# Patient Record
Sex: FEMALE | Race: WHITE | NOT HISPANIC OR LATINO
[De-identification: names, ages, dates, MRNs, and addresses within clinical notes are randomized per-mention and may not be internally consistent; named-entity substitution may affect disease eponyms.]

---

## 2018-02-08 ENCOUNTER — TRANSCRIPTION ENCOUNTER (OUTPATIENT)
Age: 28
End: 2018-02-08

## 2018-03-05 ENCOUNTER — TRANSCRIPTION ENCOUNTER (OUTPATIENT)
Age: 28
End: 2018-03-05

## 2018-08-13 ENCOUNTER — TRANSCRIPTION ENCOUNTER (OUTPATIENT)
Age: 28
End: 2018-08-13

## 2019-10-10 ENCOUNTER — TRANSCRIPTION ENCOUNTER (OUTPATIENT)
Age: 29
End: 2019-10-10

## 2019-12-17 ENCOUNTER — TRANSCRIPTION ENCOUNTER (OUTPATIENT)
Age: 29
End: 2019-12-17

## 2021-05-26 ENCOUNTER — TRANSCRIPTION ENCOUNTER (OUTPATIENT)
Age: 31
End: 2021-05-26

## 2021-06-02 ENCOUNTER — EMERGENCY (EMERGENCY)
Facility: HOSPITAL | Age: 31
LOS: 0 days | Discharge: HOME | End: 2021-06-02
Attending: STUDENT IN AN ORGANIZED HEALTH CARE EDUCATION/TRAINING PROGRAM | Admitting: STUDENT IN AN ORGANIZED HEALTH CARE EDUCATION/TRAINING PROGRAM
Payer: MEDICAID

## 2021-06-02 VITALS
SYSTOLIC BLOOD PRESSURE: 117 MMHG | TEMPERATURE: 102 F | DIASTOLIC BLOOD PRESSURE: 65 MMHG | HEART RATE: 125 BPM | RESPIRATION RATE: 22 BRPM | OXYGEN SATURATION: 100 %

## 2021-06-02 VITALS — HEART RATE: 85 BPM

## 2021-06-02 DIAGNOSIS — U07.1 COVID-19: ICD-10-CM

## 2021-06-02 DIAGNOSIS — R50.9 FEVER, UNSPECIFIED: ICD-10-CM

## 2021-06-02 DIAGNOSIS — R05 COUGH: ICD-10-CM

## 2021-06-02 DIAGNOSIS — R53.1 WEAKNESS: ICD-10-CM

## 2021-06-02 LAB
ALBUMIN SERPL ELPH-MCNC: 3.8 G/DL — SIGNIFICANT CHANGE UP (ref 3.5–5.2)
ALP SERPL-CCNC: 43 U/L — SIGNIFICANT CHANGE UP (ref 30–115)
ALT FLD-CCNC: 13 U/L — SIGNIFICANT CHANGE UP (ref 0–41)
ANION GAP SERPL CALC-SCNC: 9 MMOL/L — SIGNIFICANT CHANGE UP (ref 7–14)
AST SERPL-CCNC: 26 U/L — SIGNIFICANT CHANGE UP (ref 0–41)
BASOPHILS # BLD AUTO: 0.01 K/UL — SIGNIFICANT CHANGE UP (ref 0–0.2)
BASOPHILS NFR BLD AUTO: 0.4 % — SIGNIFICANT CHANGE UP (ref 0–1)
BILIRUB SERPL-MCNC: 0.4 MG/DL — SIGNIFICANT CHANGE UP (ref 0.2–1.2)
BUN SERPL-MCNC: 5 MG/DL — LOW (ref 10–20)
CALCIUM SERPL-MCNC: 8.9 MG/DL — SIGNIFICANT CHANGE UP (ref 8.5–10.1)
CHLORIDE SERPL-SCNC: 96 MMOL/L — LOW (ref 98–110)
CO2 SERPL-SCNC: 28 MMOL/L — SIGNIFICANT CHANGE UP (ref 17–32)
CREAT SERPL-MCNC: 0.6 MG/DL — LOW (ref 0.7–1.5)
EOSINOPHIL # BLD AUTO: 0 K/UL — SIGNIFICANT CHANGE UP (ref 0–0.7)
EOSINOPHIL NFR BLD AUTO: 0 % — SIGNIFICANT CHANGE UP (ref 0–8)
GLUCOSE SERPL-MCNC: 107 MG/DL — HIGH (ref 70–99)
HCG SERPL QL: NEGATIVE — SIGNIFICANT CHANGE UP
HCT VFR BLD CALC: 40.8 % — SIGNIFICANT CHANGE UP (ref 37–47)
HGB BLD-MCNC: 13.8 G/DL — SIGNIFICANT CHANGE UP (ref 12–16)
IMM GRANULOCYTES NFR BLD AUTO: 0.4 % — HIGH (ref 0.1–0.3)
LYMPHOCYTES # BLD AUTO: 0.69 K/UL — LOW (ref 1.2–3.4)
LYMPHOCYTES # BLD AUTO: 27.2 % — SIGNIFICANT CHANGE UP (ref 20.5–51.1)
MCHC RBC-ENTMCNC: 30 PG — SIGNIFICANT CHANGE UP (ref 27–31)
MCHC RBC-ENTMCNC: 33.8 G/DL — SIGNIFICANT CHANGE UP (ref 32–37)
MCV RBC AUTO: 88.7 FL — SIGNIFICANT CHANGE UP (ref 81–99)
MONOCYTES # BLD AUTO: 0.22 K/UL — SIGNIFICANT CHANGE UP (ref 0.1–0.6)
MONOCYTES NFR BLD AUTO: 8.7 % — SIGNIFICANT CHANGE UP (ref 1.7–9.3)
NEUTROPHILS # BLD AUTO: 1.61 K/UL — SIGNIFICANT CHANGE UP (ref 1.4–6.5)
NEUTROPHILS NFR BLD AUTO: 63.3 % — SIGNIFICANT CHANGE UP (ref 42.2–75.2)
NRBC # BLD: 0 /100 WBCS — SIGNIFICANT CHANGE UP (ref 0–0)
PLATELET # BLD AUTO: 102 K/UL — LOW (ref 130–400)
POTASSIUM SERPL-MCNC: 3.6 MMOL/L — SIGNIFICANT CHANGE UP (ref 3.5–5)
POTASSIUM SERPL-SCNC: 3.6 MMOL/L — SIGNIFICANT CHANGE UP (ref 3.5–5)
PROT SERPL-MCNC: 6.5 G/DL — SIGNIFICANT CHANGE UP (ref 6–8)
RBC # BLD: 4.6 M/UL — SIGNIFICANT CHANGE UP (ref 4.2–5.4)
RBC # FLD: 12.9 % — SIGNIFICANT CHANGE UP (ref 11.5–14.5)
SODIUM SERPL-SCNC: 133 MMOL/L — LOW (ref 135–146)
WBC # BLD: 2.54 K/UL — LOW (ref 4.8–10.8)
WBC # FLD AUTO: 2.54 K/UL — LOW (ref 4.8–10.8)

## 2021-06-02 PROCEDURE — 93010 ELECTROCARDIOGRAM REPORT: CPT

## 2021-06-02 PROCEDURE — 71045 X-RAY EXAM CHEST 1 VIEW: CPT | Mod: 26

## 2021-06-02 PROCEDURE — 99285 EMERGENCY DEPT VISIT HI MDM: CPT

## 2021-06-02 RX ORDER — KETOROLAC TROMETHAMINE 30 MG/ML
15 SYRINGE (ML) INJECTION ONCE
Refills: 0 | Status: DISCONTINUED | OUTPATIENT
Start: 2021-06-02 | End: 2021-06-02

## 2021-06-02 RX ORDER — SODIUM CHLORIDE 9 MG/ML
1000 INJECTION INTRAMUSCULAR; INTRAVENOUS; SUBCUTANEOUS ONCE
Refills: 0 | Status: COMPLETED | OUTPATIENT
Start: 2021-06-02 | End: 2021-06-02

## 2021-06-02 RX ORDER — ACETAMINOPHEN 500 MG
650 TABLET ORAL ONCE
Refills: 0 | Status: COMPLETED | OUTPATIENT
Start: 2021-06-02 | End: 2021-06-02

## 2021-06-02 RX ADMIN — Medication 15 MILLIGRAM(S): at 11:22

## 2021-06-02 RX ADMIN — Medication 650 MILLIGRAM(S): at 13:06

## 2021-06-02 RX ADMIN — SODIUM CHLORIDE 1000 MILLILITER(S): 9 INJECTION INTRAMUSCULAR; INTRAVENOUS; SUBCUTANEOUS at 11:21

## 2021-06-02 NOTE — ED PROVIDER NOTE - PHYSICAL EXAMINATION
CONST: NAD  EYES: Sclera and conjunctiva clear.   ENT: No nasal discharge. Oropharynx normal appearing, no erythema or exudates. No abscess or swelling. Uvula midline.   NECK: Non-tender, no meningeal signs. normal ROM. supple   CARD: Tachycardiac S1 S2; No jvd  RESP: LLL rhonchi. No distress  GI: Soft, non-tender, non-distended. no cva tenderness. normal BS  MS: Normal ROM in all extremities. pulses 2 +. no calf tenderness or swelling  SKIN: Warm, dry, no acute rashes. Good turgor  NEURO: A&Ox3, No focal deficits. Strength 5/5 with no sensory deficits.

## 2021-06-02 NOTE — ED PROVIDER NOTE - NS ED ROS FT
Constitutional: (+) fever  Eyes/ENT: (-) blurry vision, (-) epistaxis  Cardiovascular: (-) chest pain, (-) syncope  Respiratory: (+) cough, (+) shortness of breath  Gastrointestinal: (-) vomiting, (-) diarrhea  : (-) dysuria, (-) hematuria  Musculoskeletal: (-) neck pain, (-) back pain, (-) joint pain  Integumentary: (-) rash, (-) edema  Neurological: (-) headache, (-) altered mental status  Allergic/Immunologic: (-) pruritus

## 2021-06-02 NOTE — ED PROVIDER NOTE - CLINICAL SUMMARY MEDICAL DECISION MAKING FREE TEXT BOX
labs c/w covid, no leukocytosis. xr findings more likely related to viral pna from covid as opposed to bacterial pna. will dc w/ covid clinic f/u and return precautions

## 2021-06-02 NOTE — ED PROVIDER NOTE - ATTENDING CONTRIBUTION TO CARE
32 yo f  pt presents for eval of covid sx. pt tested positive . pt endorsing cough, fever, sob, body aches,  appetite. no vomiting or diarrhea. no pleuritic cp.  no syncope. no leg swelling. pt spoke to her pcp who advised her to come to ER for eval.      vs tachycardic, febrile  ambulatory o2 97% on RA  gen- NAD, aaox3  card-rrr  lungs-no increased wob, mild crackles to L side  abd-sntnd, no guarding or rebound  neuro- full str/sensation, cn ii-xii grossly intact, normal coordination and gait    likely covid, will get screening labs for decreased PO intake  will treat supportively, cxr given crackles  reassess

## 2021-06-02 NOTE — ED PROVIDER NOTE - OBJECTIVE STATEMENT
31y F pmh COVID (+) presents for weakness. Pt has intermittent fever, cough, sob, body aches and weakness since 5/28 when she tested (+) COVID, relief with tylenol, no aggravating factors. Denies ha, cp, dysuria, hematuria, abd pain, n/v/d/c

## 2021-06-02 NOTE — ED ADULT NURSE NOTE - OBJECTIVE STATEMENT
fever on set saturday + sick contacts at home. reports chest pain and vomiting with coughing . abdomen soft non tender non distended. Sinus tach on monitor chest pain non reproducible to palpation lung sounds clear .

## 2021-06-02 NOTE — ED ADULT NURSE NOTE - NSIMPLEMENTINTERV_GEN_ALL_ED
Implemented All Universal Safety Interventions:  [place] to call system. Call bell, personal items and telephone within reach. Instruct patient to call for assistance. Room bathroom lighting operational. Non-slip footwear when patient is off stretcher. Physically safe environment: no spills, clutter or unnecessary equipment. Stretcher in lowest position, wheels locked, appropriate side rails in place.

## 2021-06-02 NOTE — ED PROVIDER NOTE - NSFOLLOWUPINSTRUCTIONS_ED_ALL_ED_FT
Follow up with PMD and COVID recovery clinic in 1-2 days.    Novel Coronavirus (COVID-19)  The Facts  What is a coronavirus?  Coronaviruses are a large family of viruses that cause illnesses ranging from the common cold  to more severe diseases such as Middle East Respiratory Syndrome (MERS) and Severe Acute  Respiratory Syndrome (SARS).  What is Novel Coronavirus (COVID-19)?  COVID-19 is a new strain of Coronavirus that has not been previously identified in humans. COVID-19  was identified in [place], [place], [place] in December 2019 (COVID-19). COVID-19 has  since been identified outside of [place], in a growing number of countries internationally, including  the [place].  Where can I find the most recent information about COVID-19?  The Centers for Disease Control and Prevention (CDC) is closely monitoring the outbreak caused by the  COVID-19. For the latest information about COVID- 19, visit the CDC website at  https://www.cdc.gov/coronavirus/index.html  How are coronaviruses spread?  Coronaviruses can be transmitted from person-to- person, usually after close contact with an infected person,  for example, in a household, workplace, or healthcare setting via droplets that become airborne after a cough  or sneeze by an affected person. These droplets can then infect a nearby person. It is likely transmission also  occurs by touching recently contaminated surfaces.  What are the symptoms of coronavirus infection?  It depends on the virus, but common signs include fever and/or respiratory symptoms such as  cough and shortness of breath. In more severe cases, infection can cause pneumonia, severe acute  respiratory syndrome, kidney failure and even death. Fortunately, most cases of COVID-19 have an  illness no different than the influenza “flu”. With a majority of these patients having mild symptoms  and overall mortality which appears to be not much different than the flu.  Is there a treatment for a COVID-19?  There is no specific treatment for disease caused by COVID-19. However, many of the symptoms can  be treated based on the patient’s clinical condition. Supportive care for infected persons can be highly  effective.  What can I do to protect myself?  Washing your hands, covering your cough, and disinfecting surfaces are the best precautionary  measures. It is also advisable to avoid close contact with anyone showing symptoms of respiratory  illness such as coughing and sneezing. Those with symptoms should wear a surgical mask when  around others.  What can I do to protect those around me?  If you have been identified as someone who may be infected with COVID-19, we recommend you  follow the self-isolation procedures outlined below to protect those around you and limit the spread  of this virus.   March 3, 2020  Recommendations for Patients Advised to Self-Isolate  for Possible COVID-19 Exposure  We recommend the below precautionary steps from now until 14 days from when you  returned from your travel or date of your last known possible contact:  - Do not go to work, school, or public areas. Avoid using public transportation, ride-sharing, or  taxis.  - As much as possible, separate yourself from other people in your home. If you can, you should  stay in a room and away from other people in your home. Also, you should use a separate  bathroom, if available.  - Wear the supplied mask whenever you are around other people.  - If you have a non-urgent medical appointment, please reschedule for a later date. If the  appointment is urgent, please call the healthcare provider and tell them that you are on selfisolation for possible COVID-19. This will help the healthcare provider’s office take steps to keep  other people from getting infected or exposed. If you can reschedule routine appointments, do  so.  - Wash your hands often with soap and water for at least 15 to 20 seconds or clean your hands  with an alcohol-based hand  that contains 60 to 95% alcohol, covering all surfaces of  your hands and rubbing them together until they feel dry. Soap and water should be used  preferentially if hands are visibly dirty.  - Cover your mouth and nose with a tissue when you cough or sneeze. Throw used tissues in a  lined trash can; immediately wash your hands.  - Avoid touching your eyes, nose, and mouth with your hands.  - Avoid sharing personal household items. You should not share dishes, drinking glasses, cups,  eating utensils, towels, or bedding with other people or pets in your home. After using these  items, they should be washed thoroughly with soap and water.  - Clean and disinfect all “high-touch” surfaces every day. High touch surfaces include counters,  tabletops, doorknobs, light switches, remote controls, bathroom fixtures, toilets, phones,  keyboards, tablets, and bedside tables. Also, clean any surfaces that may have blood, stool, or  body fluids on them.       If you develop worsening symptoms:  - If you develop worsening symptoms, such as severe shortness of breath, please call [phone] option #9. They will assist you in determining your next steps.  During your time on self-isolation do the following:  - Work from home if you are able to so.  - Limit social isolation by talking with friends and family on the phone or with face-time  - Talk with friends and relatives who don’t live with you about supporting each other if one  household has to be quarantined. For example, agree to drop groceries or other supplies at the  front door.  - Exercise and spend time outdoors away from others if able to do so.    Why didn’t I get tested for novel coronavirus (COVID-19)?  The number of available tests is very limited so strict rules exist for who is allowed to be tested.  [facility] has been authorized to perform testing and is currently working hard to be  able to start providing the test. Such testing is currently reserved for patients who have had  contact with someone infected with the virus, or those who are very sick a plus those who have  traveled to areas identified by the Centers for Disease Control and Prevention (CD) and will  require hospitalization.  What should I do now?  If you are well enough to be discharged home and are not in a high risk group to have  contracted the COVID-10, you should care for yourself at home exactly like you would if you  have Influenza “flu”. Follow all the standard guidelines about washing your hands, covering  your cough, etc.  You should return to the Emergency Department if you develop worse symptoms, trouble  breathing, chest pain, and/or a fever that doesn’t improve with over the counter  acetaminophen or ibuprofen.

## 2021-06-02 NOTE — ED PROVIDER NOTE - PATIENT PORTAL LINK FT
You can access the FollowMyHealth Patient Portal offered by [facility] by registering at the following website: [email or web address]. By joining [email or web address]’s FollowMyHealth portal, you will also be able to view your health information using other applications (apps) compatible with our system.

## 2021-06-02 NOTE — ED PROVIDER NOTE - NSFOLLOWUPCLINICS_GEN_ALL_ED_FT
[facility] [place] [address]  Phone: [phone]  Fax:

## 2021-06-02 NOTE — ED ADULT TRIAGE NOTE - CHIEF COMPLAINT QUOTE
"I tested (+) for Covid last Friday. I came because I feel very weak. I have been sick for over a week now."

## 2021-06-05 ENCOUNTER — TRANSCRIPTION ENCOUNTER (OUTPATIENT)
Age: 31
End: 2021-06-05

## 2022-04-03 ENCOUNTER — EMERGENCY (EMERGENCY)
Facility: HOSPITAL | Age: 32
LOS: 0 days | Discharge: HOME | End: 2022-04-03
Attending: EMERGENCY MEDICINE | Admitting: EMERGENCY MEDICINE
Payer: MEDICAID

## 2022-04-03 VITALS
DIASTOLIC BLOOD PRESSURE: 83 MMHG | HEART RATE: 124 BPM | SYSTOLIC BLOOD PRESSURE: 106 MMHG | RESPIRATION RATE: 18 BRPM | OXYGEN SATURATION: 99 % | TEMPERATURE: 98 F | WEIGHT: 136.91 LBS

## 2022-04-03 VITALS
OXYGEN SATURATION: 98 % | SYSTOLIC BLOOD PRESSURE: 101 MMHG | TEMPERATURE: 99 F | RESPIRATION RATE: 18 BRPM | HEART RATE: 96 BPM | DIASTOLIC BLOOD PRESSURE: 57 MMHG

## 2022-04-03 DIAGNOSIS — R11.2 NAUSEA WITH VOMITING, UNSPECIFIED: ICD-10-CM

## 2022-04-03 DIAGNOSIS — M54.50 LOW BACK PAIN, UNSPECIFIED: ICD-10-CM

## 2022-04-03 DIAGNOSIS — M54.16 RADICULOPATHY, LUMBAR REGION: ICD-10-CM

## 2022-04-03 DIAGNOSIS — O26.891 OTHER SPECIFIED PREGNANCY RELATED CONDITIONS, FIRST TRIMESTER: ICD-10-CM

## 2022-04-03 DIAGNOSIS — Z3A.01 LESS THAN 8 WEEKS GESTATION OF PREGNANCY: ICD-10-CM

## 2022-04-03 DIAGNOSIS — O99.891 OTHER SPECIFIED DISEASES AND CONDITIONS COMPLICATING PREGNANCY: ICD-10-CM

## 2022-04-03 LAB
ALBUMIN SERPL ELPH-MCNC: 4.6 G/DL — SIGNIFICANT CHANGE UP (ref 3.5–5.2)
ALLERGY+IMMUNOLOGY DIAG STUDY NOTE: SIGNIFICANT CHANGE UP
ALP SERPL-CCNC: 67 U/L — SIGNIFICANT CHANGE UP (ref 30–115)
ALT FLD-CCNC: 13 U/L — SIGNIFICANT CHANGE UP (ref 0–41)
ANION GAP SERPL CALC-SCNC: 12 MMOL/L — SIGNIFICANT CHANGE UP (ref 7–14)
ANION GAP SERPL CALC-SCNC: 20 MMOL/L — HIGH (ref 7–14)
APPEARANCE UR: CLEAR — SIGNIFICANT CHANGE UP
AST SERPL-CCNC: 18 U/L — SIGNIFICANT CHANGE UP (ref 0–41)
BACTERIA # UR AUTO: ABNORMAL
BASOPHILS # BLD AUTO: 0.03 K/UL — SIGNIFICANT CHANGE UP (ref 0–0.2)
BASOPHILS NFR BLD AUTO: 0.2 % — SIGNIFICANT CHANGE UP (ref 0–1)
BILIRUB SERPL-MCNC: 0.9 MG/DL — SIGNIFICANT CHANGE UP (ref 0.2–1.2)
BILIRUB UR-MCNC: NEGATIVE — SIGNIFICANT CHANGE UP
BLD GP AB SCN SERPL QL: SIGNIFICANT CHANGE UP
BUN SERPL-MCNC: 6 MG/DL — LOW (ref 10–20)
BUN SERPL-MCNC: 9 MG/DL — LOW (ref 10–20)
CALCIUM SERPL-MCNC: 10 MG/DL — SIGNIFICANT CHANGE UP (ref 8.5–10.1)
CALCIUM SERPL-MCNC: 7.9 MG/DL — LOW (ref 8.5–10.1)
CHLORIDE SERPL-SCNC: 106 MMOL/L — SIGNIFICANT CHANGE UP (ref 98–110)
CHLORIDE SERPL-SCNC: 95 MMOL/L — LOW (ref 98–110)
CO2 SERPL-SCNC: 18 MMOL/L — SIGNIFICANT CHANGE UP (ref 17–32)
CO2 SERPL-SCNC: 19 MMOL/L — SIGNIFICANT CHANGE UP (ref 17–32)
COLOR SPEC: YELLOW — SIGNIFICANT CHANGE UP
CREAT SERPL-MCNC: 0.5 MG/DL — LOW (ref 0.7–1.5)
CREAT SERPL-MCNC: 0.5 MG/DL — LOW (ref 0.7–1.5)
DIFF PNL FLD: ABNORMAL
DIR ANTIGLOB POLYSPECIFIC INTERPRETATION: SIGNIFICANT CHANGE UP
EGFR: 128 ML/MIN/1.73M2 — SIGNIFICANT CHANGE UP
EGFR: 128 ML/MIN/1.73M2 — SIGNIFICANT CHANGE UP
EOSINOPHIL # BLD AUTO: 0.05 K/UL — SIGNIFICANT CHANGE UP (ref 0–0.7)
EOSINOPHIL NFR BLD AUTO: 0.4 % — SIGNIFICANT CHANGE UP (ref 0–8)
EPI CELLS # UR: ABNORMAL /HPF
GLUCOSE SERPL-MCNC: 83 MG/DL — SIGNIFICANT CHANGE UP (ref 70–99)
GLUCOSE SERPL-MCNC: 86 MG/DL — SIGNIFICANT CHANGE UP (ref 70–99)
GLUCOSE UR QL: NEGATIVE MG/DL — SIGNIFICANT CHANGE UP
HCG SERPL-ACNC: HIGH MIU/ML
HCT VFR BLD CALC: 38.4 % — SIGNIFICANT CHANGE UP (ref 37–47)
HGB BLD-MCNC: 13 G/DL — SIGNIFICANT CHANGE UP (ref 12–16)
IMM GRANULOCYTES NFR BLD AUTO: 0.4 % — HIGH (ref 0.1–0.3)
KETONES UR-MCNC: 160
LACTATE SERPL-SCNC: 1.3 MMOL/L — SIGNIFICANT CHANGE UP (ref 0.7–2)
LEUKOCYTE ESTERASE UR-ACNC: ABNORMAL
LYMPHOCYTES # BLD AUTO: 1.77 K/UL — SIGNIFICANT CHANGE UP (ref 1.2–3.4)
LYMPHOCYTES # BLD AUTO: 13.1 % — LOW (ref 20.5–51.1)
MCHC RBC-ENTMCNC: 30.5 PG — SIGNIFICANT CHANGE UP (ref 27–31)
MCHC RBC-ENTMCNC: 33.9 G/DL — SIGNIFICANT CHANGE UP (ref 32–37)
MCV RBC AUTO: 90.1 FL — SIGNIFICANT CHANGE UP (ref 81–99)
MONOCYTES # BLD AUTO: 1.18 K/UL — HIGH (ref 0.1–0.6)
MONOCYTES NFR BLD AUTO: 8.7 % — SIGNIFICANT CHANGE UP (ref 1.7–9.3)
NEUTROPHILS # BLD AUTO: 10.46 K/UL — HIGH (ref 1.4–6.5)
NEUTROPHILS NFR BLD AUTO: 77.2 % — HIGH (ref 42.2–75.2)
NITRITE UR-MCNC: NEGATIVE — SIGNIFICANT CHANGE UP
NRBC # BLD: 0 /100 WBCS — SIGNIFICANT CHANGE UP (ref 0–0)
PH UR: 5.5 — SIGNIFICANT CHANGE UP (ref 5–8)
PLATELET # BLD AUTO: 136 K/UL — SIGNIFICANT CHANGE UP (ref 130–400)
POTASSIUM SERPL-MCNC: 3.7 MMOL/L — SIGNIFICANT CHANGE UP (ref 3.5–5)
POTASSIUM SERPL-MCNC: 3.9 MMOL/L — SIGNIFICANT CHANGE UP (ref 3.5–5)
POTASSIUM SERPL-SCNC: 3.7 MMOL/L — SIGNIFICANT CHANGE UP (ref 3.5–5)
POTASSIUM SERPL-SCNC: 3.9 MMOL/L — SIGNIFICANT CHANGE UP (ref 3.5–5)
PROT SERPL-MCNC: 7.2 G/DL — SIGNIFICANT CHANGE UP (ref 6–8)
PROT UR-MCNC: NEGATIVE MG/DL — SIGNIFICANT CHANGE UP
RBC # BLD: 4.26 M/UL — SIGNIFICANT CHANGE UP (ref 4.2–5.4)
RBC # FLD: 12.2 % — SIGNIFICANT CHANGE UP (ref 11.5–14.5)
RBC CASTS # UR COMP ASSIST: ABNORMAL /HPF
SODIUM SERPL-SCNC: 134 MMOL/L — LOW (ref 135–146)
SODIUM SERPL-SCNC: 136 MMOL/L — SIGNIFICANT CHANGE UP (ref 135–146)
SP GR SPEC: >=1.03 (ref 1.01–1.03)
UROBILINOGEN FLD QL: 0.2 MG/DL — SIGNIFICANT CHANGE UP
WBC # BLD: 13.55 K/UL — HIGH (ref 4.8–10.8)
WBC # FLD AUTO: 13.55 K/UL — HIGH (ref 4.8–10.8)
WBC UR QL: ABNORMAL /HPF

## 2022-04-03 PROCEDURE — 93010 ELECTROCARDIOGRAM REPORT: CPT

## 2022-04-03 PROCEDURE — 99285 EMERGENCY DEPT VISIT HI MDM: CPT

## 2022-04-03 PROCEDURE — 86077 PHYS BLOOD BANK SERV XMATCH: CPT

## 2022-04-03 RX ORDER — ACETAMINOPHEN 500 MG
975 TABLET ORAL ONCE
Refills: 0 | Status: COMPLETED | OUTPATIENT
Start: 2022-04-03 | End: 2022-04-03

## 2022-04-03 RX ORDER — ONDANSETRON 8 MG/1
4 TABLET, FILM COATED ORAL ONCE
Refills: 0 | Status: COMPLETED | OUTPATIENT
Start: 2022-04-03 | End: 2022-04-03

## 2022-04-03 RX ORDER — SODIUM CHLORIDE 9 MG/ML
1000 INJECTION INTRAMUSCULAR; INTRAVENOUS; SUBCUTANEOUS ONCE
Refills: 0 | Status: COMPLETED | OUTPATIENT
Start: 2022-04-03 | End: 2022-04-03

## 2022-04-03 RX ORDER — ONDANSETRON 8 MG/1
1 TABLET, FILM COATED ORAL
Qty: 15 | Refills: 0
Start: 2022-04-03 | End: 2022-04-07

## 2022-04-03 RX ADMIN — SODIUM CHLORIDE 1000 MILLILITER(S): 9 INJECTION INTRAMUSCULAR; INTRAVENOUS; SUBCUTANEOUS at 17:39

## 2022-04-03 RX ADMIN — Medication 975 MILLIGRAM(S): at 20:14

## 2022-04-03 RX ADMIN — SODIUM CHLORIDE 1000 MILLILITER(S): 9 INJECTION INTRAMUSCULAR; INTRAVENOUS; SUBCUTANEOUS at 16:55

## 2022-04-03 RX ADMIN — SODIUM CHLORIDE 1000 MILLILITER(S): 9 INJECTION INTRAMUSCULAR; INTRAVENOUS; SUBCUTANEOUS at 16:01

## 2022-04-03 RX ADMIN — ONDANSETRON 4 MILLIGRAM(S): 8 TABLET, FILM COATED ORAL at 21:10

## 2022-04-03 RX ADMIN — ONDANSETRON 4 MILLIGRAM(S): 8 TABLET, FILM COATED ORAL at 17:25

## 2022-04-03 NOTE — ED PROVIDER NOTE - INTERNATIONAL TRAVEL
No [Patient Intake Form Reviewed] : Patient intake form was reviewed [Negative] : Heme/Lymph [FreeTextEntry5] : HTN, HLD [de-identified] : Bipolar Disorder [FreeTextEntry1] : Low Vit. D, DM

## 2022-04-03 NOTE — ED PROVIDER NOTE - CLINICAL SUMMARY MEDICAL DECISION MAKING FREE TEXT BOX
Patient presents for evaluation of vomiting more than 18 episodes nb, nb in the past 24 hours she denies any abdominal pain, vaginal dc, she has had spotting but it is improved we obtained us which reveals active fetal movement and normal fhr, we obtained labs initially increased anion gap given iv fluids and anti-emetics she improved on repeat labs which are normal now toelrating po well   I will discharge at this time with follow up to her ob

## 2022-04-03 NOTE — ED PROVIDER NOTE - NS ED ROS FT
Review of Systems    Constitutional: (-) fever/ chills (+)loss of appetite or  weight loss  Eyes (-) visual changes  ENT: (-) epistaxis (-) sore throat (-) ear pain  Cardiovascular: (-) chest pain, (-) syncope (-) palpitations  Respiratory: (-) cough, (-) shortness of breath  Gastrointestinal: (+) vomiting, (-) diarrhea (-) abdominal pain  : (-) dysuria , hematuria   neck: (-) neck pain or stiffness  Musculoskeletal:  (+) back pain, (-) joint pain   Integumentary: (-) rash, (-) swelling  Neurological: (-) headache, (-) altered mental status

## 2022-04-03 NOTE — ED PROVIDER NOTE - NS ED ATTENDING STATEMENT MOD
This was a shared visit with the RONNIE. I reviewed and verified the documentation and independently performed the documented:

## 2022-04-03 NOTE — ED PROVIDER NOTE - PROGRESS NOTE DETAILS
bedside ultrasound- +fetal movement.  BPM patient feeling improved. po tolerant in the Ed. will rpt bmp patient has improved at this time tolerating po denies any abdominal pain vaginal bleeding discharge or back pain instructed to follow up with her ob in the next 24h hours or return to the ED

## 2022-04-03 NOTE — ED ADULT NURSE NOTE - NSIMPLEMENTINTERV_GEN_ALL_ED
Implemented All Universal Safety Interventions:  [place] to call system. Call bell, personal items and telephone within reach. Instruct patient to call for assistance. Room bathroom lighting operational. Non-slip footwear when patient is off stretcher. Physically safe environment: no spills, clutter or unnecessary equipment. Stretcher in lowest position, wheels locked, appropriate side rails in place.

## 2022-04-03 NOTE — ED PROVIDER NOTE - OBJECTIVE STATEMENT
32 y/o female , LMP 22 presents to the ED with NBNB vomiting x 18 over past day. patient with soft stools. patient Anegative blood and received rhogam yesterday due to vaginal spotting. patient with confirmed IUP two days ago. patient unable to tolerate PO . patient c/o left lower back pain radiating down left leg worse with movement x few weeks. patient awoke at 0400 and had syncopal episode while on toilet . patient denies any cp, palpitations, dizziness, headaches. no dysuria or gross hematuria. no tingling or weakness to legs. patient denies any abdominal pain .

## 2022-04-03 NOTE — ED PROVIDER NOTE - ATTENDING CONTRIBUTION TO CARE
I was present for and supervised the key and critical aspects of the procedures performed during the care of the patient. 32 y/o female , LMP 22 presents to the ED with NBNB vomiting x 18  epiosdes worse over past day. She has been dealing with vomiting since the beginning of the pregnancy in addition also notes loose stool.   patient Anegative blood and received rhogam yesterday due to vaginal spotting. patient with confirmed IUP two days ago. patient unable to tolerate PO . patient c/o left lower back pain radiating down left leg worse with movement x few weeks. patient awoke at 0400 and had syncopal episode while on toilet . patient denies any cp, palpitations, dizziness, headaches. no dysuria or gross hematuria. no tingling or weakness to legs. patient denies any abdominal pain.  on physical exam patient is nc/at perrla eomi oropharynx clear cta b/l, rrr s1s2 noted radial pulses 2 += abd-soft ext from with no focal deficits no edema noted   a/p- patent given iv fluids iv antiemetics we obtained us which reveals normal fhr as well as active fetal movement she improved I will continue to monitor at this time

## 2022-04-03 NOTE — ED PROVIDER NOTE - CARE PROVIDER_API CALL
Sabas Dos Santos)  Obstetrics and Gynecology  [address] [place] [id number]  Phone: [phone]  Fax: [phone]  Follow Up Time:

## 2022-04-03 NOTE — ED PROVIDER NOTE - PATIENT PORTAL LINK FT
You can access the FollowMyHealth Patient Portal offered by [facility] by registering at the following website: [email or web address]. By joining [employer]’s FollowMyHealth portal, you will also be able to view your health information using other applications (apps) compatible with our system.

## 2022-04-03 NOTE — ED PROVIDER NOTE - PHYSICAL EXAMINATION
Vital Signs: I have reviewed the initial vital signs.  Constitutional: well-nourished, no acute distress, normocephalic  Eyes: PERRLA, EOMI,  clear conjunctiva  ENT: dry mucous membranes  Cardiovascular: tachycardic regular rhythm, no murmur appreciated  Respiratory: unlabored respiratory effort, clear to auscultation bilaterally  Gastrointestinal: soft, non-tender, non-distended  abdomen, no cva tenderness  Musculoskeletal: supple neck, no lower extremity edema, no bony tenderness  Integumentary: warm, dry, no rash  Neurologic: awake, alert, cranial nerves II-XII grossly intact, extremities’ motor and sensory functions grossly intact, no focal deficits, GCS 15

## 2022-04-04 LAB
ANTIBODY INTERPRETATION 2: SIGNIFICANT CHANGE UP
CULTURE RESULTS: SIGNIFICANT CHANGE UP
SPECIMEN SOURCE: SIGNIFICANT CHANGE UP

## 2022-04-06 ENCOUNTER — INPATIENT (INPATIENT)
Facility: HOSPITAL | Age: 32
LOS: 2 days | Discharge: HOME HEALTH | End: 2022-04-09
Attending: INTERNAL MEDICINE | Admitting: INTERNAL MEDICINE
Payer: MEDICAID

## 2022-04-06 VITALS
RESPIRATION RATE: 18 BRPM | SYSTOLIC BLOOD PRESSURE: 107 MMHG | HEART RATE: 99 BPM | OXYGEN SATURATION: 100 % | DIASTOLIC BLOOD PRESSURE: 75 MMHG | TEMPERATURE: 99 F

## 2022-04-06 LAB
ALBUMIN SERPL ELPH-MCNC: 3.8 G/DL — SIGNIFICANT CHANGE UP (ref 3.5–5.2)
ALP SERPL-CCNC: 64 U/L — SIGNIFICANT CHANGE UP (ref 30–115)
ALT FLD-CCNC: 10 U/L — SIGNIFICANT CHANGE UP (ref 0–41)
ANION GAP SERPL CALC-SCNC: 11 MMOL/L — SIGNIFICANT CHANGE UP (ref 7–14)
APTT BLD: 32.9 SEC — SIGNIFICANT CHANGE UP (ref 27–39.2)
AST SERPL-CCNC: 14 U/L — SIGNIFICANT CHANGE UP (ref 0–41)
BASOPHILS # BLD AUTO: 0.03 K/UL — SIGNIFICANT CHANGE UP (ref 0–0.2)
BASOPHILS NFR BLD AUTO: 0.2 % — SIGNIFICANT CHANGE UP (ref 0–1)
BILIRUB SERPL-MCNC: 0.5 MG/DL — SIGNIFICANT CHANGE UP (ref 0.2–1.2)
BLD GP AB SCN SERPL QL: SIGNIFICANT CHANGE UP
BUN SERPL-MCNC: 5 MG/DL — LOW (ref 10–20)
CALCIUM SERPL-MCNC: 9.1 MG/DL — SIGNIFICANT CHANGE UP (ref 8.5–10.1)
CHLORIDE SERPL-SCNC: 98 MMOL/L — SIGNIFICANT CHANGE UP (ref 98–110)
CO2 SERPL-SCNC: 23 MMOL/L — SIGNIFICANT CHANGE UP (ref 17–32)
CREAT SERPL-MCNC: <0.5 MG/DL — LOW (ref 0.7–1.5)
EGFR: 135 ML/MIN/1.73M2 — SIGNIFICANT CHANGE UP
EOSINOPHIL # BLD AUTO: 0.04 K/UL — SIGNIFICANT CHANGE UP (ref 0–0.7)
EOSINOPHIL NFR BLD AUTO: 0.3 % — SIGNIFICANT CHANGE UP (ref 0–8)
GLUCOSE SERPL-MCNC: 96 MG/DL — SIGNIFICANT CHANGE UP (ref 70–99)
HCG SERPL-ACNC: HIGH MIU/ML
HCT VFR BLD CALC: 35 % — LOW (ref 37–47)
HGB BLD-MCNC: 11.6 G/DL — LOW (ref 12–16)
IMM GRANULOCYTES NFR BLD AUTO: 0.7 % — HIGH (ref 0.1–0.3)
INR BLD: 1.09 RATIO — SIGNIFICANT CHANGE UP (ref 0.65–1.3)
LYMPHOCYTES # BLD AUTO: 1.41 K/UL — SIGNIFICANT CHANGE UP (ref 1.2–3.4)
LYMPHOCYTES # BLD AUTO: 10.3 % — LOW (ref 20.5–51.1)
MCHC RBC-ENTMCNC: 30 PG — SIGNIFICANT CHANGE UP (ref 27–31)
MCHC RBC-ENTMCNC: 33.1 G/DL — SIGNIFICANT CHANGE UP (ref 32–37)
MCV RBC AUTO: 90.4 FL — SIGNIFICANT CHANGE UP (ref 81–99)
MONOCYTES # BLD AUTO: 1.12 K/UL — HIGH (ref 0.1–0.6)
MONOCYTES NFR BLD AUTO: 8.2 % — SIGNIFICANT CHANGE UP (ref 1.7–9.3)
NEUTROPHILS # BLD AUTO: 10.98 K/UL — HIGH (ref 1.4–6.5)
NEUTROPHILS NFR BLD AUTO: 80.3 % — HIGH (ref 42.2–75.2)
NRBC # BLD: 0 /100 WBCS — SIGNIFICANT CHANGE UP (ref 0–0)
PLATELET # BLD AUTO: 135 K/UL — SIGNIFICANT CHANGE UP (ref 130–400)
POTASSIUM SERPL-MCNC: 3.8 MMOL/L — SIGNIFICANT CHANGE UP (ref 3.5–5)
POTASSIUM SERPL-SCNC: 3.8 MMOL/L — SIGNIFICANT CHANGE UP (ref 3.5–5)
PROT SERPL-MCNC: 6.4 G/DL — SIGNIFICANT CHANGE UP (ref 6–8)
PROTHROM AB SERPL-ACNC: 12.5 SEC — SIGNIFICANT CHANGE UP (ref 9.95–12.87)
RBC # BLD: 3.87 M/UL — LOW (ref 4.2–5.4)
RBC # FLD: 12.4 % — SIGNIFICANT CHANGE UP (ref 11.5–14.5)
SODIUM SERPL-SCNC: 132 MMOL/L — LOW (ref 135–146)
WBC # BLD: 13.68 K/UL — HIGH (ref 4.8–10.8)
WBC # FLD AUTO: 13.68 K/UL — HIGH (ref 4.8–10.8)

## 2022-04-06 PROCEDURE — 93010 ELECTROCARDIOGRAM REPORT: CPT

## 2022-04-06 PROCEDURE — 99285 EMERGENCY DEPT VISIT HI MDM: CPT | Mod: 25

## 2022-04-06 PROCEDURE — 93970 EXTREMITY STUDY: CPT | Mod: 26

## 2022-04-06 PROCEDURE — 76815 OB US LIMITED FETUS(S): CPT | Mod: 26

## 2022-04-06 RX ORDER — HEPARIN SODIUM 5000 [USP'U]/ML
INJECTION INTRAVENOUS; SUBCUTANEOUS
Qty: 25000 | Refills: 0 | Status: DISCONTINUED | OUTPATIENT
Start: 2022-04-06 | End: 2022-04-06

## 2022-04-06 RX ORDER — SODIUM CHLORIDE 9 MG/ML
1000 INJECTION, SOLUTION INTRAVENOUS ONCE
Refills: 0 | Status: COMPLETED | OUTPATIENT
Start: 2022-04-06 | End: 2022-04-06

## 2022-04-06 RX ORDER — HEPARIN SODIUM 5000 [USP'U]/ML
INJECTION INTRAVENOUS; SUBCUTANEOUS
Qty: 25000 | Refills: 0 | Status: DISCONTINUED | OUTPATIENT
Start: 2022-04-06 | End: 2022-04-08

## 2022-04-06 RX ORDER — ONDANSETRON 8 MG/1
4 TABLET, FILM COATED ORAL ONCE
Refills: 0 | Status: COMPLETED | OUTPATIENT
Start: 2022-04-06 | End: 2022-04-06

## 2022-04-06 RX ORDER — ONDANSETRON 8 MG/1
4 TABLET, FILM COATED ORAL EVERY 6 HOURS
Refills: 0 | Status: DISCONTINUED | OUTPATIENT
Start: 2022-04-06 | End: 2022-04-09

## 2022-04-06 RX ORDER — ACETAMINOPHEN 500 MG
975 TABLET ORAL ONCE
Refills: 0 | Status: COMPLETED | OUTPATIENT
Start: 2022-04-06 | End: 2022-04-06

## 2022-04-06 RX ADMIN — HEPARIN SODIUM 1100 UNIT(S)/HR: 5000 INJECTION INTRAVENOUS; SUBCUTANEOUS at 18:53

## 2022-04-06 RX ADMIN — Medication 975 MILLIGRAM(S): at 16:04

## 2022-04-06 RX ADMIN — Medication 975 MILLIGRAM(S): at 21:43

## 2022-04-06 RX ADMIN — ONDANSETRON 4 MILLIGRAM(S): 8 TABLET, FILM COATED ORAL at 16:05

## 2022-04-06 RX ADMIN — SODIUM CHLORIDE 1000 MILLILITER(S): 9 INJECTION, SOLUTION INTRAVENOUS at 16:03

## 2022-04-06 NOTE — ED ADULT NURSE REASSESSMENT NOTE - NS ED NURSE REASSESS COMMENT FT1
pt to be transferred to [facility] for further evaluation  Report given to charge nurse-Sonido MCDOWELL
pt alert and oriented x4, pt denies chest pain or SOB. pt received on heparin drip at 11 ml/ per hour. pt updated on plan of care. no acute distress noted in pt at this time. will continue to monitor.

## 2022-04-06 NOTE — ED PROVIDER NOTE - CLINICAL SUMMARY MEDICAL DECISION MAKING FREE TEXT BOX
Patient transferred to [facility] from [facility] for extensive LLE DVT, 10 weeks pregnant.  Patient denies any CP/SOB.  Started on heparin prior to transfer.  Patient seen and evaluated by vascular in ER, recommend continue heparin drip with no intervention at this time.  Patient admitted to medicine for continued care.  GYN aware.

## 2022-04-06 NOTE — ED PROVIDER NOTE - PHYSICAL EXAMINATION
Physical Exam    Vital Signs: I have reviewed the initial vital signs.  Constitutional: well-nourished, appears stated age, no acute distress  Eyes: Conjunctiva pink, Sclera clear,   Cardiovascular: S1 and S2, regular rate, regular rhythm, well-perfused extremities, radial pulses equal and 2+, pedal pulses 2+ and equal   Respiratory: unlabored respiratory effort, clear to auscultation bilaterally no wheezing, rales and rhonchi  Gastrointestinal: soft, non-tender abdomen, no pulsatile mass, normal bowl sounds  Musculoskeletal: supple neck, + left lower extremity edema throughout, no midline tenderness  Integumentary: warm, dry, no rash  Neurologic: awake, alert, nvi

## 2022-04-06 NOTE — H&P ADULT - HISTORY OF PRESENT ILLNESS
SUBJECTIVE:  31y Female with previous Spontaneous vaginal delivery at 33 weeks,  approximately 10 weeks pregnant follows Dr. Arceo at [facility] presents from home with LLE pain. Patient reports since past 1-2 months, has been having nausea and vomiting. Had 15-20 episodes of vomiting on  after which she felt lightheaded and blacked out asking her  to help out. Has been having LLE pain since last week. Went to ER last weekend and was told the pain is likely due to sciatica. Today had excruciating LLE pain disabling her to even go to the bathroom therefore she presented to [facility] ED and was [facility] since [facility] found extensive Acute LLE DVTs. Patient reports she has 3 children and is active most of the day. Walks her dog and also takes her kids to the school. Endorses poor PO intake since onset of the pregnancy which is similar to her last pregnancy.  Denies any fever, chills, headache, neurological deficits, nausea, vomiting, chest pain, palpitations, shortness of breathe, cough, abdominal pain, hematochezia, melena, hematemesis, diarrhea or constipation    In the ED:-  Labs notable for   T(C): 37.3 (22 @ 19:40), Max: 37.6 (22 @ 18:21)  T(F): 99.2 (22 @ 19:40), Max: 99.7 (22 @ 18:21)  HR: 105 (22 @ 19:40) (99 - 105)  BP: 105/69 (22 @ 19:40) (103/58 - 107/75)  RR: 17 (22 @ 19:40) (17 - 18)  SpO2: 99% (22 @ 19:40) (99% - 100%)    Admitted to med/surg for LLE pain SUBJECTIVE:  31y Female with previous Spontaneous vaginal delivery at 33 weeks,  approximately 10 weeks pregnant follows Dr. Arceo at [facility] presents from home with LLE pain. Patient reports since past 1-2 months, has been having nausea and vomiting. Had 15-20 episodes of vomiting on  after which she felt lightheaded and blacked out asking her  to help out. Has been having LLE pain since last week. Went to ER last weekend and was told the pain is likely due to sciatica. Today had excruciating LLE pain disabling her to even go to the bathroom therefore she presented to [facility] ED and was [facility] Acute LLE DVTs. Patient reports she has 3 children and is active most of the day. Walks her dog and also takes her kids to the school. Endorses poor PO intake since onset of the pregnancy which is similar to her last pregnancy.  Denies any fever, chills, headache, chest pain, palpitations, shortness of breathe, cough, abdominal pain, hematochezia, melena, hematemesis, diarrhea or constipation    In the ED:-  Labs notable for wbc 13.68, Na 132  T(C): 37.3 (22 @ 19:40), Max: 37.6 (22 @ 18:21)  T(F): 99.2 (22 @ 19:40), Max: 99.7 (22 @ 18:21)  HR: 105 (22 @ 19:40) (99 - 105)  BP: 105/69 (22 @ 19:40) (103/58 - 107/75)  RR: 17 (22 @ 19:40) (17 - 18)  SpO2: 99% (22 @ 19:40) (99% - 100%)    Admitted to med/surg for LLE pain

## 2022-04-06 NOTE — ED ADULT NURSE NOTE - NSIMPLEMENTINTERV_GEN_ALL_ED
Detail Level: Generalized
General Sunscreen Counseling: I recommended a broad spectrum sunscreen with a SPF of 30 or higher.  I explained that SPF 30 sunscreens block approximately 97 percent of the sun's harmful rays.  Sunscreens should be applied at least 15 minutes prior to expected sun exposure and then every 2 hours after that as long as sun exposure continues. If swimming or exercising sunscreen should be reapplied every 45 minutes to an hour after getting wet or sweating.  One ounce, or the equivalent of a shot glass full of sunscreen, is adequate to protect the skin not covered by a bathing suit. I also recommended a lip balm with a sunscreen as well. Sun protective clothing can be used in lieu of sunscreen but must be worn the entire time you are exposed to the sun's rays.
Implemented All Universal Safety Interventions:  [place] to call system. Call bell, personal items and telephone within reach. Instruct patient to call for assistance. Room bathroom lighting operational. Non-slip footwear when patient is off stretcher. Physically safe environment: no spills, clutter or unnecessary equipment. Stretcher in lowest position, wheels locked, appropriate side rails in place.

## 2022-04-06 NOTE — ED PROVIDER NOTE - OBJECTIVE STATEMENT
32 yo female, , 10 weeks pregnant, followed by Dr. lentz, presents to ed for left lower leg pain, mild, aching, no radiation, several days, was in ed two days ago for nv, since better. Denies fever, chills, cp, sob, abd pain, dysuria.

## 2022-04-06 NOTE — H&P ADULT - ASSESSMENT
31y Female with previous Spontaneous vaginal delivery at 33 weeks,  approximately 10 weeks pregnant follows Dr. Arceo at [facility] presents from home with LLE pain    #Acute DVT of LLE  #Previous Spontaneous Vaginal Delivery at 33 wks  - On heparin gtt  - Serial PTTs  - f/u vascular consult for extensive DVT  - f/u hematology recs for hypercoagulable w/u    # Hyperemesis  #  - 10 wk pregnant  - f/u OB/GYn consult as symptoms limiting everyday activities  - c/w zofran PRN    #Concerns for previous UTI  - f/u UA  - if +ve start abx    DVT Prophylaxis: heparin gtt  Diet: gestation  GI Prophylaxis: Not Indicated  Activity: IAT  Code Status: Full  Disposition: Acute 31y Female with previous Spontaneous vaginal delivery at 33 weeks,  approximately 10 weeks pregnant follows Dr. Arceo at [facility] presents from home with LLE pain    #Acute DVT of LLE  #Previous Spontaneous Vaginal Delivery at 33 wks  - On heparin gtt  - Serial PTTs  - f/u vascular consult for extensive DVT  - f/u hematology recs for hypercoagulable w/u    # Hyperemesis  #  - 10 wk pregnant  - f/u OB/GYn consult as symptoms limiting everyday activities  - c/w zofran PRN    #Concerns for previous UTI  #Leukocytosis  - f/u UA  - if +ve start abx    DVT Prophylaxis: heparin gtt  Diet: gestation  GI Prophylaxis: Not Indicated  Activity: IAT  Code Status: Full  Disposition: Acute

## 2022-04-06 NOTE — CONSULT NOTE ADULT - ASSESSMENT
ASSESSMENT:  30 y/o female with no PMHx , 10 weeks pregnant presents to ED c/o LLE pain and swelling.   On physical exam LLE is moderately swollen and tender to palpation, palpable distal pulses. no color changes. good motor and sensory  venous duplex shows extensive LLE DVTs    PLAN:  No acute surgical intervention  continue heparin drip, monitor ptt  LLE elevation  pain control  She will need to be discharged home on therapeutic Lovenox   will continue to follow     spectra [id number]

## 2022-04-06 NOTE — ED PROVIDER NOTE - PROGRESS NOTE DETAILS
JR: duplex shows extensive DVT of entire LLE. Vascular surgery at [facility]. Will start heparin drip and transfer [place]. JR: SERA Edward notified of transfer to [place]. Pt arrived to [place] ED, denies new complaints at this time. Spoke with vascular, will evaluate pt in ED. Paged OBGYN and pts PMD. Discussed case with Dr. Ruiz, accepts admission.

## 2022-04-06 NOTE — CONSULT NOTE ADULT - SUBJECTIVE AND OBJECTIVE BOX
GENERAL SURGERY CONSULT NOTE    Patient: JEREMIAS HILARIO , 31y (88)Female   MRN: [id number]  Location: [facility]  Visit: 22 Emergency  Date: 22 @ 20:26    HPI: 30 y/o female with no PMHx , 10 weeks pregnant presents to ED c/o LLE pain and swelling. States that she has been having pain for many days but she attributed it to sciatica. she noticed swelling on . The pain and swelling has progressively been getting worse since then which is why she decided to come to ED. Denies any recent travel. hospitalization, illness     PAST MEDICAL & SURGICAL HISTORY:  No pertinent past medical history    Home Medications:  denies    VITALS:  T(F): 99.2 (22 @ 19:40), Max: 99.7 (22 @ 18:21)  HR: 105 (22 @ 19:40) (99 - 105)  BP: 105/69 (22 @ 19:40) (103/58 - 107/75)  RR: 17 (22 @ 19:40) (17 - 18)  SpO2: 99% (22 @ 19:40) (99% - 100%)    PHYSICAL EXAM:  General: NAD, AAOx3, calm and cooperative  Cardiac: RRR S1, S2,   Respiratory: CTAB, normal respiratory effort  Abdomen: Soft, non-distended, non-tender,   Neuro: Sensation grossly intact and equal throughout, no focal deficits  Vascular:  extremities well perfused. LLE moderately swollen and tender to palpation, palpable distal pulses. no color changes. good motor and sensory  Skin: Warm/dry, normal color, no jaundice    MEDICATIONS  (STANDING):  heparin  Infusion.  Unit(s)/Hr (11 mL/Hr) IV Continuous <Continuous>    MEDICATIONS  (PRN):      LAB/STUDIES:                        11.6   13.68 )-----------( 135      ( 2022 15:40 )             35.0     -    132<L>  |  98  |  5<L>  ----------------------------<  96  3.8   |  23  |  <0.5<L>    Ca    9.1      2022 15:40    TPro  6.4  /  Alb  3.8  /  TBili  0.5  /  DBili  x   /  AST  14  /  ALT  10  /  AlkPhos  64  04-06    PT/INR - ( 2022 15:40 )   PT: 12.50 sec;   INR: 1.09 ratio         PTT - ( 2022 15:40 )  PTT:32.9 sec  LIVER FUNCTIONS - ( 2022 15:40 )  Alb: 3.8 g/dL / Pro: 6.4 g/dL / ALK PHOS: 64 U/L / ALT: 10 U/L / AST: 14 U/L / GGT: x             IMAGING:  < from: [facility] Duplex Lower Ext Vein Scan, Bilat (22 @ 17:25) >  Impression:  Acute deep vein thrombosis of the left common femoral, femoral,   popliteal, gastrocnemius venous sinus, left posterior tibial and peroneal   veins.  Superficial thrombophlebitis of the left great saphenous and lesser   saphenous veins.  Vascular surgical consultation is recommended if clinically indicated.    < end of copied text >      ACCESS DEVICES:  [ x] Peripheral IV  [ ] Central Venous Line	[ ] R	[ ] L	[ ] IJ	[ ] Fem	[ ] SC	Placed:   [ ] Arterial Line		[ ] R	[ ] L	[ ] Fem	[ ] Rad	[ ] Ax	Placed:   [ ] PICC:					[ ] Mediport  [ ] Urinary Catheter, Date Placed:

## 2022-04-06 NOTE — ED PROVIDER NOTE - ATTENDING CONTRIBUTION TO CARE
31-year-old female  approximately 10 weeks pregnant presents to the ER for significant pain and swelling to her left lower extremity that acutely worsened today.  She reports the pain is so severe that she cannot walk, was barely able to make it to the bathroom.  Denies shortness of breath, chest pain.  She follows with Dr. Arceo and had her last ultrasound on  which was normal.  Her last pregnancy resulted in a  at 33 weeks gestation.    vitals noted, triage note reviewed    Gen - NAD, Head - NCAT, Pharynx - clear, MMM, Heart - RRR, no m/g/r, Lungs - CTAB, no w/c/r, Abdomen - soft, NT, ND, Skin - No rash, Extremities - LLE exquisitely tender, most significantly at the L calf, and medial thigh, no erythema, ecchymosis, brisk cap refill, Neuro - A&O x3, equal strength and sensation, non-focal exam    a/p:  LLE pain swelling  labs, duplex US  reassess

## 2022-04-07 LAB
ALBUMIN SERPL ELPH-MCNC: 3.3 G/DL — LOW (ref 3.5–5.2)
ALLERGY+IMMUNOLOGY DIAG STUDY NOTE: SIGNIFICANT CHANGE UP
ALP SERPL-CCNC: 60 U/L — SIGNIFICANT CHANGE UP (ref 30–115)
ALT FLD-CCNC: 10 U/L — SIGNIFICANT CHANGE UP (ref 0–41)
ANION GAP SERPL CALC-SCNC: 9 MMOL/L — SIGNIFICANT CHANGE UP (ref 7–14)
APPEARANCE UR: ABNORMAL
APTT BLD: 70.3 SEC — CRITICAL HIGH (ref 27–39.2)
APTT BLD: 73.2 SEC — CRITICAL HIGH (ref 27–39.2)
AST SERPL-CCNC: 12 U/L — SIGNIFICANT CHANGE UP (ref 0–41)
BACTERIA # UR AUTO: ABNORMAL
BASOPHILS # BLD AUTO: 0.03 K/UL — SIGNIFICANT CHANGE UP (ref 0–0.2)
BASOPHILS NFR BLD AUTO: 0.3 % — SIGNIFICANT CHANGE UP (ref 0–1)
BILIRUB SERPL-MCNC: 0.2 MG/DL — SIGNIFICANT CHANGE UP (ref 0.2–1.2)
BILIRUB UR-MCNC: NEGATIVE — SIGNIFICANT CHANGE UP
BUN SERPL-MCNC: 5 MG/DL — LOW (ref 10–20)
CALCIUM SERPL-MCNC: 8.3 MG/DL — LOW (ref 8.5–10.1)
CHLORIDE SERPL-SCNC: 103 MMOL/L — SIGNIFICANT CHANGE UP (ref 98–110)
CO2 SERPL-SCNC: 23 MMOL/L — SIGNIFICANT CHANGE UP (ref 17–32)
COLOR SPEC: SIGNIFICANT CHANGE UP
CREAT SERPL-MCNC: <0.5 MG/DL — LOW (ref 0.7–1.5)
D DIMER BLD IA.RAPID-MCNC: 901 NG/ML DDU — HIGH (ref 0–230)
DIFF PNL FLD: ABNORMAL
DIR ANTIGLOB POLYSPECIFIC INTERPRETATION: SIGNIFICANT CHANGE UP
EGFR: 144 ML/MIN/1.73M2 — SIGNIFICANT CHANGE UP
EOSINOPHIL # BLD AUTO: 0.06 K/UL — SIGNIFICANT CHANGE UP (ref 0–0.7)
EOSINOPHIL NFR BLD AUTO: 0.5 % — SIGNIFICANT CHANGE UP (ref 0–8)
EPI CELLS # UR: 8 /HPF — HIGH (ref 0–5)
GLUCOSE SERPL-MCNC: 115 MG/DL — HIGH (ref 70–99)
GLUCOSE UR QL: NEGATIVE — SIGNIFICANT CHANGE UP
HCT VFR BLD CALC: 30 % — LOW (ref 37–47)
HGB BLD-MCNC: 10.3 G/DL — LOW (ref 12–16)
HYALINE CASTS # UR AUTO: 2 /LPF — SIGNIFICANT CHANGE UP (ref 0–7)
IMM GRANULOCYTES NFR BLD AUTO: 0.4 % — HIGH (ref 0.1–0.3)
KETONES UR-MCNC: NEGATIVE — SIGNIFICANT CHANGE UP
LEUKOCYTE ESTERASE UR-ACNC: ABNORMAL
LYMPHOCYTES # BLD AUTO: 1.41 K/UL — SIGNIFICANT CHANGE UP (ref 1.2–3.4)
LYMPHOCYTES # BLD AUTO: 12.3 % — LOW (ref 20.5–51.1)
MAGNESIUM SERPL-MCNC: 1.8 MG/DL — SIGNIFICANT CHANGE UP (ref 1.8–2.4)
MCHC RBC-ENTMCNC: 31.1 PG — HIGH (ref 27–31)
MCHC RBC-ENTMCNC: 34.3 G/DL — SIGNIFICANT CHANGE UP (ref 32–37)
MCV RBC AUTO: 90.6 FL — SIGNIFICANT CHANGE UP (ref 81–99)
MONOCYTES # BLD AUTO: 1.22 K/UL — HIGH (ref 0.1–0.6)
MONOCYTES NFR BLD AUTO: 10.6 % — HIGH (ref 1.7–9.3)
NEUTROPHILS # BLD AUTO: 8.74 K/UL — HIGH (ref 1.4–6.5)
NEUTROPHILS NFR BLD AUTO: 75.9 % — HIGH (ref 42.2–75.2)
NITRITE UR-MCNC: NEGATIVE — SIGNIFICANT CHANGE UP
NRBC # BLD: 0 /100 WBCS — SIGNIFICANT CHANGE UP (ref 0–0)
PH UR: 7.5 — SIGNIFICANT CHANGE UP (ref 5–8)
PLATELET # BLD AUTO: 148 K/UL — SIGNIFICANT CHANGE UP (ref 130–400)
POTASSIUM SERPL-MCNC: 3.8 MMOL/L — SIGNIFICANT CHANGE UP (ref 3.5–5)
POTASSIUM SERPL-SCNC: 3.8 MMOL/L — SIGNIFICANT CHANGE UP (ref 3.5–5)
PROT SERPL-MCNC: 5.6 G/DL — LOW (ref 6–8)
PROT UR-MCNC: NEGATIVE — SIGNIFICANT CHANGE UP
RBC # BLD: 3.31 M/UL — LOW (ref 4.2–5.4)
RBC # FLD: 12.7 % — SIGNIFICANT CHANGE UP (ref 11.5–14.5)
RBC CASTS # UR COMP ASSIST: 2 /HPF — SIGNIFICANT CHANGE UP (ref 0–4)
SARS-COV-2 RNA SPEC QL NAA+PROBE: SIGNIFICANT CHANGE UP
SODIUM SERPL-SCNC: 135 MMOL/L — SIGNIFICANT CHANGE UP (ref 135–146)
SP GR SPEC: 1.01 — SIGNIFICANT CHANGE UP (ref 1.01–1.03)
UROBILINOGEN FLD QL: SIGNIFICANT CHANGE UP
WBC # BLD: 11.51 K/UL — HIGH (ref 4.8–10.8)
WBC # FLD AUTO: 11.51 K/UL — HIGH (ref 4.8–10.8)
WBC UR QL: 36 /HPF — HIGH (ref 0–5)

## 2022-04-07 PROCEDURE — 86077 PHYS BLOOD BANK SERV XMATCH: CPT

## 2022-04-07 PROCEDURE — 99221 1ST HOSP IP/OBS SF/LOW 40: CPT | Mod: GC

## 2022-04-07 PROCEDURE — 99232 SBSQ HOSP IP/OBS MODERATE 35: CPT

## 2022-04-07 RX ORDER — ACETAMINOPHEN 500 MG
650 TABLET ORAL EVERY 6 HOURS
Refills: 0 | Status: DISCONTINUED | OUTPATIENT
Start: 2022-04-07 | End: 2022-04-09

## 2022-04-07 RX ORDER — ACETAMINOPHEN 500 MG
650 TABLET ORAL ONCE
Refills: 0 | Status: DISCONTINUED | OUTPATIENT
Start: 2022-04-07 | End: 2022-04-09

## 2022-04-07 RX ADMIN — Medication 650 MILLIGRAM(S): at 03:55

## 2022-04-07 RX ADMIN — ONDANSETRON 4 MILLIGRAM(S): 8 TABLET, FILM COATED ORAL at 03:06

## 2022-04-07 RX ADMIN — HEPARIN SODIUM 1100 UNIT(S)/HR: 5000 INJECTION INTRAVENOUS; SUBCUTANEOUS at 15:58

## 2022-04-07 RX ADMIN — HEPARIN SODIUM 1100 UNIT(S)/HR: 5000 INJECTION INTRAVENOUS; SUBCUTANEOUS at 17:45

## 2022-04-07 RX ADMIN — ONDANSETRON 4 MILLIGRAM(S): 8 TABLET, FILM COATED ORAL at 18:32

## 2022-04-07 RX ADMIN — HEPARIN SODIUM 1100 UNIT(S)/HR: 5000 INJECTION INTRAVENOUS; SUBCUTANEOUS at 10:36

## 2022-04-07 RX ADMIN — Medication 650 MILLIGRAM(S): at 08:34

## 2022-04-07 RX ADMIN — Medication 1 TABLET(S): at 11:50

## 2022-04-07 RX ADMIN — ONDANSETRON 4 MILLIGRAM(S): 8 TABLET, FILM COATED ORAL at 09:28

## 2022-04-07 NOTE — CONSULT NOTE ADULT - ASSESSMENT
31y Female with previous Spontaneous vaginal delivery at 33 weeks,  approximately 10 weeks pregnant follows Dr. Arceo at [facility] presents from home with LLE pain. Duplex scan shows and acute DVTs of LLE. Patient admitted and on Heparin gtt. Heme/onc consulted for hypercoagulable w/u of DVTs in pregnancy.     #Acute DVTs of LLE is most likely due to complication of pregnancy   - DDx include but less likely an inherited clotting disorder or autoimmune disorder   - continue heparin gtt, monitor ptt   -  31y Female with previous Spontaneous vaginal delivery at 33 weeks,  approximately 10 weeks pregnant follows Dr. Arceo at [facility] presents from home with LLE pain. Duplex scan shows and acute DVTs of LLE. Patient admitted and on Heparin gtt. Heme/onc consulted for hypercoagulable w/u of DVTs in pregnancy.     #Acute DVTs of LLE due to complication of pregnancy   - continue heparin gtt, monitor ptt   - recommend discharge on Lovenox 60 mg BID 1mg/kg and ct throughout duration of pregnancy and up till 6 wks post partum   - F/u w/ Heme/onc outpatient for management of anticoagulation  31y Female with previous Spontaneous vaginal delivery at 33 weeks,  approximately 10 weeks pregnant follows Dr. Arceo at [facility] presents from home with LLE pain. Duplex scan shows and acute DVTs of LLE. Patient admitted and on Heparin gtt. Heme/onc consulted for hypercoagulable w/u of DVTs in pregnancy.     #Acute DVT of LLE, provoked in the setting of pregnancy   - continue heparin gtt, monitor ptt   - If primary team is agreeable, she can be switched to Lovenox 1mg/kg that will need to be continued throughout course of pregnancy and 6 weeks post partum in order to complete a total of 3 months of anticoagulation. She can be discharged on 60mg BID and it will need to adjusted with weight changes throughout her pregnancy   - She will be arranged for outpatient follow up with our office in about 3-4 weeks   - She was advised for any future pregnancies, she will require prophylactic anticoagulation

## 2022-04-07 NOTE — CONSULT NOTE ADULT - ASSESSMENT
31y  at 10w0d with nausea and vomiting of pregnancy, extensive VTE in the left lower extremity on heparin drip      INCOMPLETE PENDING FINAL ASSESSMENT 31y  at 10w0d with nausea and vomiting of pregnancy, extensive VTE in the left lower extremity on heparin drip, currently clinically and hemodynamically stable    -Pyridoxine 25mg [id number] + Benadryl 25mg Nightly standing  -Compazine 10mg [id number] standing  -Zofran 4mg  [id number] PRN  -Banana bag daily then continuous IV hydration with LR at 125cc/h  -Pepcid 20mg BID standing  -Daily weights   -NPO and advance diet as tolerated to bland diet  -If symptoms are worsening, consider nutrition consult  -Continue heparin drip  -Recommend Hem/onc consult, consider APLS work up + Thrombophilia work up  -Vitals [id number] and pulse oximetry  -After treatment, will need to be on ppx anticoagulation with Lovenox until 6 weeks postpartum  -Follow up with maternal fetal medicine when discharged.  -Needs FHR prior to discharge  -GYN team to follow daily      Dr. Geri freeman and Dr. Jang to be made aware

## 2022-04-07 NOTE — PATIENT PROFILE ADULT - FALL HARM RISK - RISK INTERVENTIONS
Assistance OOB with selected safe patient handling equipment/Assistance with ambulation/Communicate Fall Risk and Risk Factors to all staff, patient, and family/Discuss with provider need for PT consult/Monitor gait and stability/Reinforce activity limits and safety measures with patient and family/Visual Cue: Yellow wristband/Bed in lowest position, wheels locked, appropriate side rails in place/Call bell, personal items and telephone in reach/Instruct patient to call for assistance before getting out of bed or chair/Non-slip footwear when patient is out of bed/[place] to call system/Physically safe environment - no spills, clutter or unnecessary equipment/Purposeful Proactive Rounding/Room/bathroom lighting operational, light cord in reach

## 2022-04-07 NOTE — CONSULT NOTE ADULT - ATTENDING COMMENTS
This is a 31-year-old female with previous Spontaneous vaginal delivery at 33 weeks,  approximately 10 weeks pregnant who was found to have an extensive left lower extremity DVT.  She has no personal family history of thrombosis and had uneventful last pregnancy.  She is currently on heparin drip but PT and PTT prior to the heparin drip  were normal. .    We were asked to see the patient for thrombophilia testing.    She has pain in the left lower extremity       Plan:  #LLE extensive DVT likely provoked due to pregnancy  -She is currently on heparin drip  -I explained that thrombophilia testing at this point would not be accurate since it is affected by pregnancy itself, acute clot, as well as anticoagulation and it is unlikely to change my management  -We recommend that the patient goes home on Lovenox 1 mg/kg twice a day, 60 mg twice a day. It can be started now and heparin drip can be stopped  -This should continue throughout her pregnancy as well as 6 weeks postpartum  -Depending on the mode of delivery , vaginal, and timing we can make a plan in the future in regards to when to stop Lovenox or using heparin prior to delivery and so forth   -I explained that if she decides to get pregnant again she informed me she does not want to have anymore  children but if she does she will need prophylaxis with Lovenox  and also I recommended against OCPs with estrogen in the future if not on anticoagulation   -She can follow-up in the office in 3 to 4 weeks and I will also monitor her weight and adjust Lovenox as needed.    Thank you
extensive vte  n/v of pregnancy   medical management of n/v-see above

## 2022-04-07 NOTE — CONSULT NOTE ADULT - SUBJECTIVE AND OBJECTIVE BOX
Patient is a 31y old  Female who presents with a chief complaint of LLE pain (2022 06:26)      HPI:  SUBJECTIVE:  31y Female with previous Spontaneous vaginal delivery at 33 weeks,  approximately 10 weeks pregnant follows Dr. Arceo at [facility] presents from home with LLE pain. Patient reports for the past few weeks she has been feeling sick, with multiple episodes of emesis a day (8-10x) unable to keep food or liquids down. She reports she had an episode of vaginal spotting last week, for which she received Rhogam on  due to Rh negative status. She then reports onset of severe nausea and vomiting ~20x on 4/3 with dehydration and left lower leg pain. Patient presented to the [facility] site on 4/3 and was told she had sciatica. Pain continued to progress until patient was unable to ambulate, with loss of consciousness at home, at which point her OBGYN Dr. Dos Santos suggested she present to the ED for evaluation of DVT on . On presentation she had excruciating LLE pain disabling her to even go to the bathroom therefore she presented to [facility] ED and was transferred here since VA doppler found extensive Acute LLE DVTs. Patient reports she has 3 children and is active most of the day. Walks her dog and also takes her kids to the school. Endorses poor PO intake since onset of the pregnancy which is similar to her last pregnancy. Denies any fever, chills, headache, chest pain, palpitations, shortness of breathe, cough, abdominal pain, hematochezia, melena, hematemesis, diarrhea or constipation. Denies any personal or family history of VTE.      In the ED:-  Labs notable for wbc 13.68, Na 132  T(C): 37.3 (22 @ 19:40), Max: 37.6 (22 @ 18:21)  T(F): 99.2 (22 @ 19:40), Max: 99.7 (22 @ 18:21)  HR: 105 (22 @ 19:40) (99 - 105)  BP: 105/69 (22 @ 19:40) (103/58 - 107/75)  RR: 17 (04-06-22 @ 19:40) (17 - 18)  SpO2: 99% (22 @ 19:40) (99% - 100%)    Admitted to med/surg for LLE pain (2022 23:44)       ROS:  Negative except for:    PAST MEDICAL & SURGICAL HISTORY:  Spontaneous vaginal delivery  at wk 33    SOCIAL HISTORY: Denies cigarette use, alcohol use, or illicit drug use    FAMILY HISTORY: denies hx of clots in immediate family      MEDICATIONS  (STANDING):  acetaminophen     Tablet .. 650 milliGRAM(s) Oral once  heparin  Infusion.  Unit(s)/Hr (11 mL/Hr) IV Continuous <Continuous>  prenatal multivitamin 1 Tablet(s) Oral daily    MEDICATIONS  (PRN):  acetaminophen     Tablet .. 650 milliGRAM(s) Oral every 6 hours PRN Mild Pain (1 - 3)  ondansetron Injectable 4 milliGRAM(s) IV Push every 6 hours PRN Nausea and/or Vomiting      Weight (kg): 62.709 (22 @ 17:47)  Allergies    No Known Allergies    Intolerances        Vital Signs Last 24 Hrs  T(C): 36.5 (2022 07:53), Max: 37.6 (2022 18:21)  T(F): 97.7 (2022 07:53), Max: 99.7 (2022 18:21)  HR: 111 (2022 07:53) (99 - 118)  BP: 111/72 (2022 07:53) (103/58 - 122/70)  BP(mean): --  RR: 18 (2022 07:53) (17 - 18)  SpO2: 99% (2022 19:40) (99% - 100%)    PHYSICAL EXAM  General: adult in NAD  HEENT: clear oropharynx, anicteric sclera, pink conjunctiva  Neck: supple  CV: normal S1/S2 with no murmur rubs or gallops  Lungs: positive air movement b/l ant lungs,clear to auscultation, no wheezes, no rales  Abdomen: soft non-tender non-distended, no hepatosplenomegaly  Ext: no clubbing cyanosis or edema  Skin: no rashes and no petechiae  Neuro: alert and oriented X 4, no focal deficits      LABS:                          10.3   11.51 )-----------( 148      ( 2022 06:45 )             30.0         Mean Cell Volume : 90.6 fL  Mean Cell Hemoglobin : 31.1 pg  Mean Cell Hemoglobin Concentration : 34.3 g/dL  Auto Neutrophil # : 8.74 K/uL  Auto Lymphocyte # : 1.41 K/uL  Auto Monocyte # : 1.22 K/uL  Auto Eosinophil # : 0.06 K/uL  Auto Basophil # : 0.03 K/uL  Auto Neutrophil % : 75.9 %  Auto Lymphocyte % : 12.3 %  Auto Monocyte % : 10.6 %  Auto Eosinophil % : 0.5 %  Auto Basophil % : 0.3 %      Serial CBC's   @ 06:45  Hct-30.0 / Hgb-10.3 / Plat-148 / RBC-3.31 / WBC-11.51  Serial CBC's   @ 15:40  Hct-35.0 / Hgb-11.6 / Plat-135 / RBC-3.87 / WBC-13.68  Serial CBC's   @ 16:00  Hct-38.4 / Hgb-13.0 / Plat-136 / RBC-4.26 / WBC-13.55          135  |  103  |  5<L>  ----------------------------<  115<H>  3.8   |  23  |  <0.5<L>    Ca    8.3<L>      2022 06:45  Mg     1.8         TPro  5.6<L>  /  Alb  3.3<L>  /  TBili  0.2  /  DBili  x   /  AST  12  /  ALT  10  /  AlkPhos  60  -      PT/INR - ( 2022 15:40 )   PT: 12.50 sec;   INR: 1.09 ratio         PTT - ( 2022 06:45 )  PTT:73.2 sec    D-Dimer Assay, Quantitative (22 @ 06:45)    D-Dimer Assay, Quantitative: 901: Manufacturers recommended Cut off for VTE is 230 ng/ml D-DU ng/mL DDU        BLOOD SMEAR INTERPRETATION:       RADIOLOGY & ADDITIONAL STUDIES:       EXAM:  DUPLEX SCAN EXT VEINS LOWER BI                        Impression:  Acute deep vein thrombosis of the left common femoral, femoral,   popliteal, gastrocnemius venous sinus, left posterior tibial and peroneal   veins.  Superficial thrombophlebitis of the left great saphenous and lesser   saphenous veins.  Vascular surgical consultation is recommended if clinically indicated.

## 2022-04-07 NOTE — CONSULT NOTE ADULT - SUBJECTIVE AND OBJECTIVE BOX
Chief Complaint: leg pain    HPI: 31y  at 10w0d by first trimester sonogram admitted due to extensive DVT in LLE. Patient reports for the past few weeks she has been feeling sick, with multiple episodes of emesis a day (8-10x) unable to keep food or liquids down. She reports she had an episode of vaginal spotting last week, for which she recieved Rhogam on  due to Rh negative status. She then reports onset of severe nausea and vomiting ~20x on 4/3 with dehydration and left lower leg pain. Patient presented to the [facility] site on 4/3 and was told she had sciatica. Pain continued to progress until patient was unable to ambulate, at which point her OBGYN Dr. Dos Santos suggested she present to the ED for evaluation of DVT on . On presentation she had excruciating LLE pain disabling her to even go to the bathroom therefore she presented to [facility] and [facility] LLE DVTs. Patient reports she has 3 children and is active most of the day. Walks her dog and also takes her kids to the school. Endorses poor PO intake since onset of the pregnancy which is similar to her last pregnancy.  Denies any fever, chills, headache, chest pain, palpitations, shortness of breathe, cough, abdominal pain, hematochezia, melena, hematemesis, diarrhea or constipation  Denies any personal or family history of VTE.    Ob/Gyn History:                   LMP -  22                 Cycle Length - irregular  Denies history of ovarian cysts, uterine fibroids, abnormal paps, or STIs  denies h/o miscarriage or abortions  P1 2013 34w  male, PPROM at 33w    Home Medications:  Prenatal Multivitamins Folic Acid 1 mg oral tablet: 1 tab(s) orally once a day (2022 23:12)    Allergies No Known Allergies    PAST MEDICAL & SURGICAL HISTORY:  Spontaneous vaginal delivery  at wk 33    FAMILY HISTORY: denies pertinent hx      SOCIAL HISTORY: Denies cigarette use, alcohol use, or illicit drug use    Vital Signs Last 24 Hrs  T(F): 98.5 (2022 02:00), Max: 99.7 (2022 18:21)  HR: 118 (2022 02:00) (99 - 118)  BP: 122/70 (2022 02:00) (103/58 - 122/70)  RR: 18 (2022 02:00) (17 - 18)    Weight (kg): 62.709 (22 @ 17:47)    General Appearance - AAOx3, NAD  Heart - S1S2 regular rate and rhythm  Lung - CTA Bilaterally  Abdomen - Soft, nontender, nondistended, no rebound, no rigidity, no guarding, bowel sounds present    GYN/Pelvis: deferred      Meds:   (ADM OVERRIDE) 3 each &lt;see task&gt; GiveOnce  (ADM OVERRIDE) 1 each &lt;see task&gt; GiveOnce  (ADM OVERRIDE) 1 each &lt;see task&gt; GiveOnce  acetaminophen     Tablet .. 975 milliGRAM(s) Oral once  acetaminophen     Tablet .. 975 milliGRAM(s) Oral once  lactated ringers Bolus 1000 milliLiter(s) IV Bolus once  ondansetron Injectable 4 milliGRAM(s) IV Push once      Weight (kg): 62.709 (22 @ 17:47)    LABS:                        11.6   13.68 )-----------( 135      ( 2022 15:40 )             35.0     HCG Quantitative, Serum: 190683.0 mIU/mL (22 @ 15:40)  HCG Quantitative, Serum: 275664.0 mIU/mL (22 @ 16:00)    ABO RH Interpretation: A NEG (22 @ 19:58)  Antibody Screen: POS (22 @ 19:58)    04-    132<L>  |  98  |  5<L>  ----------------------------<  96  3.8   |  23  |  <0.5<L>    Ca    9.1      2022 15:40    TPro  6.4  /  Alb  3.8  /  TBili  0.5  /  DBili  x   /  AST  14  /  ALT  10  /  AlkPhos  64  04-06    PT/INR - ( 2022 15:40 )   PT: 12.50 sec;   INR: 1.09 ratio         PTT - ( 2022 15:40 )  PTT:32.9 sec      Culture - Urine (collected 22 @ 18:10)  Source: Clean Catch Clean Catch (Midstream)  Final Report (22 @ 22:58):    <10,000 CFU/mL Normal Urogenital Flor        RADIOLOGY & ADDITIONAL STUDIES:   Chief Complaint: leg pain    HPI: 31y  at 10w0d by first trimester sonogram admitted due to extensive DVT in LLE. Patient reports for the past few weeks she has been feeling sick, with multiple episodes of emesis a day (8-10x) unable to keep food or liquids down. She reports she had an episode of vaginal spotting last week, for which she recieved Rhogam on  due to Rh negative status. She then reports onset of severe nausea and vomiting ~20x on 4/3 with dehydration and left lower leg pain. Patient presented to the [facility] site on 4/3 and was told she had sciatica. Pain continued to progress until patient was unable to ambulate, at which point her OBGYN Dr. Dos Santos suggested she present to the ED for evaluation of DVT on . On presentation she had excruciating LLE pain disabling her to even go to the bathroom therefore she presented to [facility] ED and was transferred here since [facility] doppler found extensive Acute LLE DVTs. Patient reports she has 3 children and is active most of the day. Walks her dog and also takes her kids to the school. Endorses poor PO intake since onset of the pregnancy which is similar to her last pregnancy. This pregnancy she has tried Unisom tablets with no relief. Patient states she recieved zofran yesterday and has been able to tolerating food and liquids.   Denies any fever, chills, headache, chest pain, palpitations, shortness of breathe, cough, abdominal pain, hematochezia, melena, hematemesis, diarrhea or constipation  Denies any personal or family history of VTE.    Ob/Gyn History:                   LMP -  22                 Cycle Length - irregular  Denies history of ovarian cysts, uterine fibroids, abnormal paps, or STIs  denies h/o miscarriage or abortions  P1 2013 34w  male, PPROM at 33w    Home Medications:  Prenatal Multivitamins Folic Acid 1 mg oral tablet: 1 tab(s) orally once a day (2022 23:12)    Allergies No Known Allergies    PAST MEDICAL & SURGICAL HISTORY:  Spontaneous vaginal delivery  at wk 33    FAMILY HISTORY: denies pertinent hx      SOCIAL HISTORY: Denies cigarette use, alcohol use, or illicit drug use    Vital Signs Last 24 Hrs  T(F): 98.5 (2022 02:00), Max: 99.7 (2022 18:21)  HR: 118 (2022 02:00) (99 - 118)  BP: 122/70 (2022 02:00) (103/58 - 122/70)  RR: 18 (2022 02:00) (17 - 18)    Weight (kg): 62.709 (22 @ 17:47)    General Appearance - AAOx3, NAD  Heart - S1S2 regular rate and rhythm  Lung - CTA Bilaterally  Abdomen - Soft, nontender, nondistended, no rebound, no rigidity, no guarding, bowel sounds present  Extremities:   -right leg: no swelling, erythema, tenderness, or palpable cords  -left leg: pressure wrap in place over entire leg up to the level of the thigh, diffuse tenderness to palpation    GYN/Pelvis: deferred      Meds:   acetaminophen     Tablet .. 975 milliGRAM(s) Oral once  acetaminophen     Tablet .. 975 milliGRAM(s) Oral once  lactated ringers Bolus 1000 milliLiter(s) IV Bolus once  ondansetron Injectable 4 milliGRAM(s) IV Push once      Weight (kg): 62.709 (22 @ 17:47)    LABS:                        11.6   13.68 )-----------( 135      ( 2022 15:40 )             35.0     HCG Quantitative, Serum: 378687.0 mIU/mL (22 @ 15:40)  HCG Quantitative, Serum: 522114.0 mIU/mL (22 @ 16:00)    ABO RH Interpretation: A NEG (22 @ 19:58)  Antibody Screen: POS (22 @ 19:58)    04-    132<L>  |  98  |  5<L>  ----------------------------<  96  3.8   |  23  |  <0.5<L>    Ca    9.1      2022 15:40    TPro  6.4  /  Alb  3.8  /  TBili  0.5  /  DBili  x   /  AST  14  /  ALT  10  /  AlkPhos  64  04-06    PT/INR - ( 2022 15:40 )   PT: 12.50 sec;   INR: 1.09 ratio         PTT - ( 2022 15:40 )  PTT:32.9 sec      Culture - Urine (collected 22 @ 18:10)  Source: Clean Catch Clean Catch (Midstream)  Final Report (22 @ 22:58):    <10,000 CFU/mL Normal Urogenital Flor        RADIOLOGY & ADDITIONAL STUDIES:   Chief Complaint: leg pain    HPI: 31y  at 10w0d by first trimester sonogram admitted due to extensive DVT in LLE. Patient reports for the past few weeks she has been feeling sick, with multiple episodes of emesis a day (8-10x) unable to keep food or liquids down. She reports she had an episode of vaginal spotting last week, for which she recieved Rhogam on  due to Rh negative status. She then reports onset of severe nausea and vomiting ~20x on 4/3 with dehydration and left lower leg pain. Patient presented to the [facility] site on 4/3 and was told she had sciatica. Pain continued to progress until patient was unable to ambulate, with loss of consciousness at home, at which point her OBGYN Dr. Dos Santos suggested she present to the ED for evaluation of DVT on . On presentation she had excruciating LLE pain disabling her to even go to the bathroom therefore she presented to [facility] ED and was transferred here since [facility] found extensive Acute LLE DVTs. Patient reports she has 3 children and is active most of the day. Walks her dog and also takes her kids to the school. Endorses poor PO intake since onset of the pregnancy which is similar to her last pregnancy. This pregnancy she has tried Unisom tablets with no relief. Patient states she recieved zofran yesterday and has been able to tolerating food and liquids.   Denies any fever, chills, headache, chest pain, palpitations, shortness of breathe, cough, abdominal pain, hematochezia, melena, hematemesis, diarrhea or constipation  Denies any personal or family history of VTE.    Ob/Gyn History:                   LMP -  22                 Cycle Length - irregular  Denies history of ovarian cysts, uterine fibroids, abnormal paps, or STIs  denies h/o miscarriage or abortions  P1 2013 34w  male, PPROM at 33w    Home Medications:  Prenatal Multivitamins Folic Acid 1 mg oral tablet: 1 tab(s) orally once a day (2022 23:12)    Allergies No Known Allergies    PAST MEDICAL & SURGICAL HISTORY:  Spontaneous vaginal delivery  at wk 33    FAMILY HISTORY: denies pertinent hx      SOCIAL HISTORY: Denies cigarette use, alcohol use, or illicit drug use    Vital Signs Last 24 Hrs  T(F): 98.5 (2022 02:00), Max: 99.7 (2022 18:21)  HR: 118 (2022 02:00) (99 - 118)  BP: 122/70 (2022 02:00) (103/58 - 122/70)  RR: 18 (2022 02:00) (17 - 18)    Weight (kg): 62.709 (22 @ 17:47)    General Appearance - AAOx3, NAD  Heart - S1S2 regular rate and rhythm  Lung - CTA Bilaterally  Abdomen - Soft, nontender, nondistended, no rebound, no rigidity, no guarding, bowel sounds present  Extremities:   -right leg: no swelling, erythema, tenderness, or palpable cords  -left leg: pressure wrap in place over entire leg up to the level of the thigh, diffuse tenderness to palpation    GYN/Pelvis: deferred      Meds:   acetaminophen     Tablet .. 975 milliGRAM(s) Oral once  acetaminophen     Tablet .. 975 milliGRAM(s) Oral once  lactated ringers Bolus 1000 milliLiter(s) IV Bolus once  ondansetron Injectable 4 milliGRAM(s) IV Push once      Weight (kg): 62.709 (22 @ 17:47)    LABS:                        11.6   13.68 )-----------( 135      ( 2022 15:40 )             35.0     HCG Quantitative, Serum: 165344.0 mIU/mL (22 @ 15:40)  HCG Quantitative, Serum: 404446.0 mIU/mL (22 @ 16:00)    ABO RH Interpretation: A NEG (22 @ 19:58)  Antibody Screen: POS (22 @ 19:58)    -    132<L>  |  98  |  5<L>  ----------------------------<  96  3.8   |  23  |  <0.5<L>    Ca    9.1      2022 15:40    TPro  6.4  /  Alb  3.8  /  TBili  0.5  /  DBili  x   /  AST  14  /  ALT  10  /  AlkPhos  64  -    PT/INR - ( 2022 15:40 )   PT: 12.50 sec;   INR: 1.09 ratio         PTT - ( 2022 15:40 )  PTT:32.9 sec      Culture - Urine (collected 22 @ 18:10)  Source: Clean Catch Clean Catch (Midstream)  Final Report (22 @ 22:58):    <10,000 CFU/mL Normal Urogenital Flor        RADIOLOGY & ADDITIONAL STUDIES:  < from: [facility] Duplex Lower Ext Vein Scan, Bilat (22 @ 17:25) >  INTERPRETATION:  CLINICAL INFORMATION: The patient is a 31-year-old   female with leg swelling. A venous duplex examination was performed to   evaluate the patient for deep venous thrombosis of the lower extremities.    Acute deep vein thrombosis of the left common femoral, femoral, popliteal   and gastrocnemius venous sinus.    The common femoral, great saphenous, femoral, popliteal and small   saphenous veins were visualized on the right with no evidence of deep   venous thrombosis    All veins were fully compressible.  There was presence of spontaneous   flow, augmentation with distal compression and phasicity.    The anterior tibial veins were  patent    The left posterior tibial veins were thrombosed    The left peroneal veins were thrombosed    Impression:  Acute deep vein thrombosis of the left common femoral, femoral,   popliteal, gastrocnemius venous sinus, left posterior tibial and peroneal   veins.  Superficial thrombophlebitis of the left great saphenous and lesser   saphenous veins.  Vascular surgical consultation is recommended if clinically indicated.      < end of copied text >    < from: 12 Lead ECG (22 @ 16:44) >    Ventricular Rate 97 BPM    Atrial Rate 97 BPM    P-R Interval 164 ms    QRS Duration 72 ms    Q-T Interval 320 ms    QTC Calculation(Bazett) 406 ms    P Axis 74 degrees    R Axis 77 degrees    T Axis 64 degrees    Diagnosis Line Normal sinus rhythm  Normal ECG    Confirmed by RICARDO RUDD, SHARRI () on 4/3/2022 10:25:23 PM    < end of copied text >       Chief Complaint: leg pain    HPI: 31y  at 10w0d by LMP consistent with first trimester sonogram admitted due to extensive DVTs in LLE, GYN consulted for management of suspected nausea and vomiting of pregnancy . Patient reports for the past few weeks she has been feeling sick, with multiple episodes of emesis a day (8-10x) unable to keep food or liquids down. She reports she had an episode of vaginal spotting last week, for which she received Rhogam on  due to Rh negative status. She then reports onset of severe nausea and vomiting ~20x on 4/3 with dehydration and left lower leg pain. Patient presented to the [facility] site on 4/3 and was told she had sciatica. Pain continued to progress until patient was unable to ambulate, with loss of consciousness at home, at which point her OBGYN Dr. Dos Santos suggested she present to the ED for evaluation of DVT on . On presentation she had excruciating LLE pain disabling her to even go to the bathroom therefore she presented to [facility] ED and was transferred here since [facility] doppler found extensive Acute LLE DVTs. Patient reports she has 3 children and is active most of the day. Walks her dog and also takes her kids to the school. Endorses poor PO intake since onset of the pregnancy which is similar to her last pregnancy. This pregnancy she has tried Unisom tablets with no relief. Patient states she received zofran yesterday and has been able to tolerate food and liquids.   Denies any fever, chills, headache, chest pain, palpitations, shortness of breathe, cough, abdominal pain, hematochezia, melena, hematemesis, diarrhea or constipation  Denies any personal or family history of VTE.    Ob/Gyn History:                   LMP -  22                 Cycle Length - irregular  Denies history of ovarian cysts, uterine fibroids, abnormal paps, or STIs  denies h/o miscarriage or abortions  P1 2013 34w  male, PPROM at 33w    PAST MEDICAL & SURGICAL HISTORY:  Spontaneous vaginal delivery at wk 33  No other significant past medical or surgical hx    Home Medications:  Prenatal Multivitamins Folic Acid 1 mg oral tablet: 1 tab(s) orally once a day (2022 23:12)    Allergies No Known Allergies    FAMILY HISTORY: denies hx of clots in immediate family    SOCIAL HISTORY: Denies cigarette use, alcohol use, or illicit drug use    Vital Signs Last 24 Hrs  T(F): 98.5 (2022 02:00), Max: 99.7 (2022 18:21)  HR: 118 (2022 02:00) (99 - 118)  BP: 122/70 (2022 02:00) (103/58 - 122/70)  RR: 18 (2022 02:00) (17 - 18)    Weight (kg): 62.709 (22 @ 17:47)    General Appearance - AAOx3, NAD  Heart - S1S2 regular rate and rhythm  Lung - CTA Bilaterally  Abdomen - Soft, nontender, nondistended, no rebound, no rigidity, no guarding, bowel sounds present  Extremities:   -right leg: no swelling, erythema, tenderness, or palpable cords  -left leg: pressure wrap in place over entire leg up to the level of the thigh, diffuse tenderness to palpation    GYN/Pelvis: deferred      Meds:   acetaminophen     Tablet .. 975 milliGRAM(s) Oral once  acetaminophen     Tablet .. 975 milliGRAM(s) Oral once  lactated ringers Bolus 1000 milliLiter(s) IV Bolus once  ondansetron Injectable 4 milliGRAM(s) IV Push once      Weight (kg): 62.709 (22 @ 17:47)    LABS:                        11.6   13.68 )-----------( 135      ( 2022 15:40 )             35.0     HCG Quantitative, Serum: 907574.0 mIU/mL (22 @ 15:40)  HCG Quantitative, Serum: 757801.0 mIU/mL (22 @ 16:00)    ABO RH Interpretation: A NEG (22 @ 19:58)  Antibody Screen: POS (22 @ 19:58)    04-    132<L>  |  98  |  5<L>  ----------------------------<  96  3.8   |  23  |  <0.5<L>    Ca    9.1      2022 15:40    TPro  6.4  /  Alb  3.8  /  TBili  0.5  /  DBili  x   /  AST  14  /  ALT  10  /  AlkPhos  64  04-06    PT/INR - ( 2022 15:40 )   PT: 12.50 sec;   INR: 1.09 ratio         PTT - ( 2022 15:40 )  PTT:32.9 sec      Culture - Urine (collected 22 @ 18:10)  Source: Clean Catch Clean Catch (Midstream)  Final Report (22 @ 22:58):    <10,000 CFU/mL Normal Urogenital Flor        RADIOLOGY & ADDITIONAL STUDIES:  < from: [facility] Duplex Lower Ext Vein Scan, Bilat (22 @ 17:25) >  INTERPRETATION:  CLINICAL INFORMATION: The patient is a 31-year-old   female with leg swelling. A venous duplex examination was performed to   evaluate the patient for deep venous thrombosis of the lower extremities.    Acute deep vein thrombosis of the left common femoral, femoral, popliteal   and gastrocnemius venous sinus.    The common femoral, great saphenous, femoral, popliteal and small   saphenous veins were visualized on the right with no evidence of deep   venous thrombosis    All veins were fully compressible.  There was presence of spontaneous   flow, augmentation with distal compression and phasicity.    The anterior tibial veins were  patent    The left posterior tibial veins were thrombosed    The left peroneal veins were thrombosed    Impression:  Acute deep vein thrombosis of the left common femoral, femoral,   popliteal, gastrocnemius venous sinus, left posterior tibial and peroneal   veins.  Superficial thrombophlebitis of the left great saphenous and lesser   saphenous veins.  Vascular surgical consultation is recommended if clinically indicated.      < end of copied text >    < from: 12 Lead ECG (22 @ 16:44) >    Ventricular Rate 97 BPM    Atrial Rate 97 BPM    P-R Interval 164 ms    QRS Duration 72 ms    Q-T Interval 320 ms    QTC Calculation(Bazett) 406 ms    P Axis 74 degrees    R Axis 77 degrees    T Axis 64 degrees    Diagnosis Line Normal sinus rhythm  Normal ECG    Confirmed by RICARDO RUDD, SHARRI ([id number]) on 4/3/2022 10:25:23 PM    < end of copied text >

## 2022-04-08 ENCOUNTER — TRANSCRIPTION ENCOUNTER (OUTPATIENT)
Age: 32
End: 2022-04-08

## 2022-04-08 LAB
ANION GAP SERPL CALC-SCNC: 13 MMOL/L — SIGNIFICANT CHANGE UP (ref 7–14)
APTT BLD: 84.3 SEC — CRITICAL HIGH (ref 27–39.2)
BUN SERPL-MCNC: 3 MG/DL — LOW (ref 10–20)
CALCIUM SERPL-MCNC: 9 MG/DL — SIGNIFICANT CHANGE UP (ref 8.5–10.1)
CHLORIDE SERPL-SCNC: 103 MMOL/L — SIGNIFICANT CHANGE UP (ref 98–110)
CO2 SERPL-SCNC: 23 MMOL/L — SIGNIFICANT CHANGE UP (ref 17–32)
CREAT SERPL-MCNC: <0.5 MG/DL — LOW (ref 0.7–1.5)
EGFR: 135 ML/MIN/1.73M2 — SIGNIFICANT CHANGE UP
GLUCOSE SERPL-MCNC: 80 MG/DL — SIGNIFICANT CHANGE UP (ref 70–99)
HCT VFR BLD CALC: 32.9 % — LOW (ref 37–47)
HGB BLD-MCNC: 10.7 G/DL — LOW (ref 12–16)
MCHC RBC-ENTMCNC: 29.9 PG — SIGNIFICANT CHANGE UP (ref 27–31)
MCHC RBC-ENTMCNC: 32.5 G/DL — SIGNIFICANT CHANGE UP (ref 32–37)
MCV RBC AUTO: 91.9 FL — SIGNIFICANT CHANGE UP (ref 81–99)
NRBC # BLD: 0 /100 WBCS — SIGNIFICANT CHANGE UP (ref 0–0)
PLATELET # BLD AUTO: 179 K/UL — SIGNIFICANT CHANGE UP (ref 130–400)
POTASSIUM SERPL-MCNC: 4.1 MMOL/L — SIGNIFICANT CHANGE UP (ref 3.5–5)
POTASSIUM SERPL-SCNC: 4.1 MMOL/L — SIGNIFICANT CHANGE UP (ref 3.5–5)
RBC # BLD: 3.58 M/UL — LOW (ref 4.2–5.4)
RBC # FLD: 12.6 % — SIGNIFICANT CHANGE UP (ref 11.5–14.5)
SODIUM SERPL-SCNC: 139 MMOL/L — SIGNIFICANT CHANGE UP (ref 135–146)
WBC # BLD: 11.91 K/UL — HIGH (ref 4.8–10.8)
WBC # FLD AUTO: 11.91 K/UL — HIGH (ref 4.8–10.8)

## 2022-04-08 RX ORDER — NITROFURANTOIN MACROCRYSTAL 50 MG
1 CAPSULE ORAL
Qty: 0 | Refills: 0 | DISCHARGE
Start: 2022-04-08

## 2022-04-08 RX ORDER — PYRIDOXINE HCL (VITAMIN B6) 100 MG
1 TABLET ORAL
Qty: 60 | Refills: 0
Start: 2022-04-08 | End: 2022-05-07

## 2022-04-08 RX ORDER — PYRIDOXINE HCL (VITAMIN B6) 100 MG
25 TABLET ORAL EVERY 12 HOURS
Refills: 0 | Status: DISCONTINUED | OUTPATIENT
Start: 2022-04-08 | End: 2022-04-09

## 2022-04-08 RX ORDER — PROCHLORPERAZINE MALEATE 5 MG
1 TABLET ORAL
Qty: 60 | Refills: 0
Start: 2022-04-08 | End: 2022-05-07

## 2022-04-08 RX ORDER — NITROFURANTOIN MACROCRYSTAL 50 MG
1 CAPSULE ORAL
Qty: 14 | Refills: 0
Start: 2022-04-08 | End: 2022-04-14

## 2022-04-08 RX ORDER — FAMOTIDINE 10 MG/ML
1 INJECTION INTRAVENOUS
Qty: 60 | Refills: 0
Start: 2022-04-08 | End: 2022-05-07

## 2022-04-08 RX ORDER — FAMOTIDINE 10 MG/ML
20 INJECTION INTRAVENOUS
Refills: 0 | Status: DISCONTINUED | OUTPATIENT
Start: 2022-04-08 | End: 2022-04-09

## 2022-04-08 RX ORDER — NITROFURANTOIN MACROCRYSTAL 50 MG
100 CAPSULE ORAL
Refills: 0 | Status: DISCONTINUED | OUTPATIENT
Start: 2022-04-08 | End: 2022-04-09

## 2022-04-08 RX ORDER — PROCHLORPERAZINE MALEATE 5 MG
10 TABLET ORAL EVERY 12 HOURS
Refills: 0 | Status: DISCONTINUED | OUTPATIENT
Start: 2022-04-08 | End: 2022-04-09

## 2022-04-08 RX ORDER — ENOXAPARIN SODIUM 100 MG/ML
1 INJECTION SUBCUTANEOUS
Qty: 60 | Refills: 0
Start: 2022-04-08 | End: 2022-05-07

## 2022-04-08 RX ORDER — ENOXAPARIN SODIUM 100 MG/ML
60 INJECTION SUBCUTANEOUS EVERY 12 HOURS
Refills: 0 | Status: DISCONTINUED | OUTPATIENT
Start: 2022-04-08 | End: 2022-04-09

## 2022-04-08 RX ADMIN — Medication 650 MILLIGRAM(S): at 00:07

## 2022-04-08 RX ADMIN — Medication 100 MILLIGRAM(S): at 17:30

## 2022-04-08 RX ADMIN — ONDANSETRON 4 MILLIGRAM(S): 8 TABLET, FILM COATED ORAL at 06:10

## 2022-04-08 RX ADMIN — Medication 650 MILLIGRAM(S): at 20:00

## 2022-04-08 RX ADMIN — Medication 10 MILLIGRAM(S): at 17:30

## 2022-04-08 RX ADMIN — Medication 650 MILLIGRAM(S): at 11:19

## 2022-04-08 RX ADMIN — Medication 1 TABLET(S): at 11:20

## 2022-04-08 RX ADMIN — Medication 650 MILLIGRAM(S): at 21:00

## 2022-04-08 RX ADMIN — Medication 25 MILLIGRAM(S): at 17:31

## 2022-04-08 RX ADMIN — ENOXAPARIN SODIUM 60 MILLIGRAM(S): 100 INJECTION SUBCUTANEOUS at 12:53

## 2022-04-08 RX ADMIN — FAMOTIDINE 20 MILLIGRAM(S): 10 INJECTION INTRAVENOUS at 17:30

## 2022-04-08 RX ADMIN — Medication 650 MILLIGRAM(S): at 15:22

## 2022-04-08 NOTE — PROGRESS NOTE ADULT - SUBJECTIVE AND OBJECTIVE BOX
JEREMIAS HILARIO 31y O W Female transferred from S ER for c/o LLE pain and swelling x 1 wk due to acute  LLE extensive DVT on venous duplex.  The pt is on IV heparin.  The pt also states she has had N/V due to he pregnant state.  The pt was evaluated by Vasc, OB and Hem.  The plan is to control the N/V, control leg pain and transition to Lovenox 1mg/kg/ 60mg BID for duration of pregnancy and 6 wk postpartum  for total of 3mos.  There is concernof underlying hypercoagulable state and pt will ff up with Hem in 3-4 wks.    INTERVAL HPI/OVERNIGHT EVENTS: pt uncomfortable but stable    MEDICATIONS  (STANDING):  acetaminophen     Tablet .. 650 milliGRAM(s) Oral once  heparin  Infusion.  Unit(s)/Hr (11 mL/Hr) IV Continuous <Continuous>  prenatal multivitamin 1 Tablet(s) Oral daily    MEDICATIONS  (PRN):  acetaminophen     Tablet .. 650 milliGRAM(s) Oral every 6 hours PRN Mild Pain (1 - 3)  ondansetron Injectable 4 milliGRAM(s) IV Push every 6 hours PRN Nausea and/or Vomiting      Allergies    No Known Allergies    Intolerances        REVIEW OF SYSTEMS      General:  no fever, N/V		    Respiratory and Thorax: no CP or SOB or coug or hematemesis  	  Cardiovascular:	no CP    Gastrointestinal:	+ N/V, no abd pain    Genitourinary: + dysuria  	    Vital Signs Last 24 Hrs  T(C): 36.1 (2022 16:12), Max: 37.6 (2022 18:21)  T(F): 96.9 (2022 16:12), Max: 99.7 (2022 18:21)  HR: 108 (2022 16:12) (102 - 118)  BP: 103/63 (2022 16:12) (103/58 - 122/70)  BP(mean): --  RR: 18 (2022 07:53) (17 - 18)  SpO2: 100% (2022 16:12) (99% - 100%)    PHYSICAL EXAM:      Constitutional:  A&), uncomfortable but in NAD    Eyes: nonicteric    ENMT: dry oral mucosa    Neck: supple    Respiratory: shallow resp, no whizing/crackles or rales    Cardiovascular: S1S2 reg, tachy, no murmur    Gastrointestinal: soft and benign, + BS    Genitourinary: no diaz    Extremities: moves all ext, + edema of LLE    Vascular:+ pedal pulses BL    Neurological: nonfocal    LABS:                        10.3   11.51 )-----------( 148      ( 2022 06:45 )             30.0     04-07    135  |  103  |  5<L>  ----------------------------<  115<H>  3.8   |  23  |  <0.5<L>    Ca    8.3<L>      2022 06:45  Mg     1.8     04-07  + BHCG  A-/+Ab  Covid neg    TPro  5.6<L>  /  Alb  3.3<L>  /  TBili  0.2  /  DBili  x   /  AST  12  /  ALT  10  /  AlkPhos  60  04-07    PT/INR - ( 2022 15:40 )   PT: 12.50 sec;   INR: 1.09 ratio         PTT - ( 2022 16:09 )  PTT:70.3 sec  Urinalysis Basic - ( 2022 10:38 )    Color: Light Yellow / Appearance: Slightly Turbid / S.009 / pH: x  Gluc: x / Ketone: Negative  / Bili: Negative / Urobili: <2 mg/dL   Blood: x / Protein: Negative / Nitrite: Negative   Leuk Esterase: Large / RBC: 2 /HPF / WBC 36 /HPF   Sq Epi: x / Non Sq Epi: 8 /HPF / Bacteria: Few        RADIOLOGY & ADDITIONAL TESTS:  EKG:  NS, tachy 100/min, no acute changes    Venous dopler of lower ext:  + acute DVT of LLE, CF, femoral, popliteal, gastrecnemius, posterior tibial and peroneal, + superficial thrombophlebitis  
Chief Complaint:     HPI: Pt doing well, pain well controlled. No overnight events, no acute complaints. Reports nausea is improved on standing zofran.    ROS: Denies cardiovascular or respiratory symptoms    PAST MEDICAL & SURGICAL HISTORY:  Spontaneous vaginal delivery at wk 33    Physical Exam  Vital Signs Last 24 Hrs  T(F): 98.7 (07 Apr 2022 23:46), Max: 98.7 (07 Apr 2022 23:46)  HR: 99 (07 Apr 2022 23:46) (99 - 111)  BP: 96/59 (07 Apr 2022 23:46) (96/59 - 111/72)  RR: 18 (07 Apr 2022 23:46) (18 - 18)    Physical exam:  General - AAOx3, NAD  Heart - S1S2, RRR  Lungs - CTA BL  Abdomen:  Extremities:  - no swelling, eythema, tenderness, or palpable cords in RLE  - LLE wrapped, mildly tender to palpation    Labs:                        10.3   11.51 )-----------( 148      ( 07 Apr 2022 06:45 )             30.0                         11.6   13.68 )-----------( 135      ( 06 Apr 2022 15:40 )             35.0     135  |  103  |  5  ----------------------------<115  3.8  |  23  |  <0.5    Magnesium, Serum: 1.8 mg/dL (04-07-22 @ 06:45)        Antibody Screen: POS (04-06-22 @ 19:58)  Antibody Screen: POS (04-03-22 @ 16:00)    
HPI:  SUBJECTIVE:  31y Female with previous Spontaneous vaginal delivery at 33 weeks,  approximately 10 weeks pregnant follows Dr. Arceo at [facility] presents from home with LLE pain. Patient reports since past 1-2 months, has been having nausea and vomiting. Had 15-20 episodes of vomiting on  after which she felt lightheaded and blacked out asking her  to help out. Has been having LLE pain since last week. Went to ER last weekend and was told the pain is likely due to sciatica. Today had excruciating LLE pain disabling her to even go to the bathroom therefore she presented to [facility] ED and was transferred here since [facility] doppler found extensive Acute LLE DVTs. Patient reports she has 3 children and is active most of the day. Walks her dog and also takes her kids to the school. Endorses poor PO intake since onset of the pregnancy which is similar to her last pregnancy.  Denies any fever, chills, headache, chest pain, palpitations, shortness of breathe, cough, abdominal pain, hematochezia, melena, hematemesis, diarrhea or constipation    In the ED:-  Labs notable for wbc 13.68, Na 132  T(C): 37.3 (22 @ 19:40), Max: 37.6 (22 @ 18:21)  T(F): 99.2 (22 @ 19:40), Max: 99.7 (22 @ 18:21)  HR: 105 (22 @ 19:40) (99 - 105)  BP: 105/69 (22 @ 19:40) (103/58 - 107/75)  RR: 17 (22 @ 19:40) (17 - 18)  SpO2: 99% (22 @ 19:40) (99% - 100%)    Admitted to med/surg for LLE pain (2022 23:44)    Currently admitted to medicine with the primary diagnosis of DVT (deep vein thrombosis) in pregnancy       Today is hospital day 1d.     INTERVAL HPI / OVERNIGHT EVENTS:  Patient was examined and seen at bedside. This morning she is resting in bed. Endorses moderate left leg pain and nausea, which is being well-controlled by Zofran. Denies any vomitus or diarrhea. On further ROS, pt denies any headache, lightheadedness, SOB, chest pain, or any other complaints.     ROS: Otherwise unremarkable     PAST MEDICAL & SURGICAL HISTORY  Spontaneous vaginal delivery  at wk 33      ALLERGIES  No Known Allergies    MEDICATIONS  STANDING MEDICATIONS  acetaminophen     Tablet .. 650 milliGRAM(s) Oral once  heparin  Infusion.  Unit(s)/Hr IV Continuous <Continuous>  prenatal multivitamin 1 Tablet(s) Oral daily    PRN MEDICATIONS  acetaminophen     Tablet .. 650 milliGRAM(s) Oral every 6 hours PRN  ondansetron Injectable 4 milliGRAM(s) IV Push every 6 hours PRN    VITALS:  T(F): 97.7  HR: 111  BP: 111/72  RR: 18  SpO2: 99%    PHYSICAL EXAM  GEN: NAD, Resting comfortably in bed  PULM: Clear to auscultation bilaterally, No wheezes  CVS: Regular rate and rhythm, S1-S2, no murmurs  ABD: Soft, non-tender, non-distended, no guarding  EXT: Wrapped LLE, moderate tenderness of LLE   NEURO: A&Ox3, no focal deficits    LABS                        10.3   11.51 )-----------( 148      ( 2022 06:45 )             30.0       04-07    135  |  103  |  5<L>  ----------------------------<  115<H>  3.8   |  23  |  <0.5<L>    Ca    8.3<L>      2022 06:45  Mg     1.8     04-07    TPro  5.6<L>  /  Alb  3.3<L>  /  TBili  0.2  /  DBili  x   /  AST  12  /  ALT  10  /  AlkPhos  60  04-07              Urinalysis Basic - ( 2022 10:38 )    Color: Light Yellow / Appearance: Slightly Turbid / S.009 / pH: x  Gluc: x / Ketone: Negative  / Bili: Negative / Urobili: <2 mg/dL   Blood: x / Protein: Negative / Nitrite: Negative   Leuk Esterase: Large / RBC: 2 /HPF / WBC 36 /HPF   Sq Epi: x / Non Sq Epi: 8 /HPF / Bacteria: Few        PT/INR - ( 2022 15:40 )   PT: 12.50 sec;   INR: 1.09 ratio         PTT - ( 2022 06:45 )  PTT:73.2 sec          CAPILLARY BLOOD GLUCOSE                  RADIOLOGY

## 2022-04-08 NOTE — DISCHARGE NOTE NURSING/CASE MANAGEMENT/SOCIAL WORK - PATIENT PORTAL LINK FT
You can access the FollowMyHealth Patient Portal offered by [facility] by registering at the following website: [email or web address]. By joining [facility]’s FollowMyHealth portal, you will also be able to view your health information using other applications (apps) compatible with our system.

## 2022-04-08 NOTE — PROGRESS NOTE ADULT - ASSESSMENT
30 YO WF 10 wks pregnant ad with extensive LLE DVT, pain and sx of hyperemesis gravidarum with concern over ubderlying hypercoagulable state.  The pt is on IV heparin and will be transitioned to Lovenox for the duration of her pregnancy and 6 wks postpartum.  The pt was evaluated by Vasc surgery, Hem and OB.     LLE pain and swelling due to extensive LLE acute DVT  N/V due to hyperemesis gravidarum  10 wk   mild anemia  R/O UTI    Venous duplex reviewed and  shows extensiv acute DVT of LLE in the CM, femoral, gastrocnemius, post tib, peroneal with superficial thrombophlebitis  IV heparin in place  monitor PTT and adjust  IV fluids  elevate the LLE  Vasc surgery consult appreciated  Hem consult:  pt will need AC with Lovenox 1mg/kg/60mg BID for duration of pregnancy and 6 wks postpartum for total of 3mos, will need hypercoagulable w/u as there i s concern for underlying hypercoagulable state, ff up with Hem as out pt in 3 wks  OB Dr Arceo consult :  IV fluids/LR at 125ml/hr,  benadryl 25mg, Compazine 10mg BID, Zofran 4mg  q6 PRN, pyridoxine 25m,  "Banana Bag" QD, pepcid 20mg BID   Social SVC consult to plan for safe D/C  
31y  at 10w0d with nausea and vomiting of pregnancy, extensive VTE in the left lower extremity on heparin drip, currently clinically and hemodynamically stable,  #nausea and vomiting of pregnancy  -discussed with patient that zofran is considered third line for treatment of nausea and vomiting of pregnancy, explained that nausea and vomiting of pregnancy often begins before 9 weeks gestation and can often continue until at least 20 weeks gestation. Long term care and antiemetic regimen is recommended. Standard of care recommendations provided to care team:  -Pyridoxine 25mg [id number] + Benadryl 25mg Nightly standing  -Compazine 10mg [id number] standing  -Zofran 4mg  [id number] PRN  -Banana bag daily then continuous IV hydration with LR at 125cc/h  -Pepcid 20mg BID standing  -Daily weights   -NPO and advance diet as tolerated to bland diet  -If symptoms are worsening, consider nutrition consult    #LLE DVT  -Continue heparin drip  -Agree with heme/onc recommendations: anticoagulation for duration of pregnancy with postpartum anticoagulation plus work up for thrombophilia  -Vitals [id number] and pulse oximetry  -Follow up with maternal fetal medicine when discharged.    #pregnancy  -Needs FHR prior to discharge  -GYN team to follow daily      Dr. Geri freeman and OB attending to be made aware 
31y Female with previous Spontaneous vaginal delivery at 33 weeks,  approximately 10 weeks pregnant follows Dr. Arceo at [facility] presents from home with LLE pain    #Acute DVT of LLE  #Previous Spontaneous Vaginal Delivery at 33 wks  - On heparin gtt  - Serial PTTs: today's PTT 73.2, in therapeutic range  - f/u vascular consult for extensive DVT  - f/u hematology recs for hypercoagulable w/u    # Hyperemesis  #  - 10 wk pregnant  - f/u OB/GYn consult as symptoms limiting everyday activities  - c/w zofran PRN    #Concerns for previous UTI  #Leukocytosis  - UA positive for WBC 36 and leukocyte esterase  - start Abx     DVT Prophylaxis: heparin gtt  Diet: gestation  GI Prophylaxis: Not Indicated  Activity: IAT  Code Status: Full  Disposition: Acute

## 2022-04-08 NOTE — DISCHARGE NOTE PROVIDER - CARE PROVIDER_API CALL
Trinh Ruiz)  Medicine  [address]  [place] [address]  Phone: [phone]  Fax: [phone]  Follow Up Time: 1 week    Sanjay Dos Santos  Obstetrics and Gynecology  [facility] [address]  [place] [address]  Phone: [phone]  Fax: [phone]  Follow Up Time: 1 week

## 2022-04-08 NOTE — DISCHARGE NOTE PROVIDER - NSDCMRMEDTOKEN_GEN_ALL_CORE_FT
enoxaparin 60 mg/0.6 mL injectable solution: 1 application subcutaneously 2 times a day  (60 mg)   nitrofurantoin macrocrystals-monohydrate 100 mg oral capsule: 1 cap(s) orally 2 times a day  ondansetron 4 mg oral tablet: 1 tab(s) orally 3 times a day   Prenatal Multivitamins Folic Acid 1 mg oral tablet: 1 tab(s) orally once a day   enoxaparin 60 mg/0.6 mL injectable solution: 1 application subcutaneously 2 times a day  (60 mg)   famotidine 20 mg oral tablet: 1 tab(s) orally 2 times a day  nitrofurantoin macrocrystals-monohydrate 100 mg oral capsule: 1 cap(s) orally 2 times a day  ondansetron 4 mg oral tablet: 1 tab(s) orally 3 times a day   Prenatal Multivitamins Folic Acid 1 mg oral tablet: 1 tab(s) orally once a day  prochlorperazine 10 mg oral tablet: 1 tab(s) orally every 12 hours  pyridoxine 25 mg oral tablet: 1 tab(s) orally every 12 hours

## 2022-04-08 NOTE — DISCHARGE NOTE PROVIDER - PROVIDER TOKENS
PROVIDER:[TOKEN:[[id number]],FOLLOWUP:[1 week]],PROVIDER:[TOKEN:[[id number]],FOLLOWUP:[1 week]]

## 2022-04-08 NOTE — DISCHARGE NOTE NURSING/CASE MANAGEMENT/SOCIAL WORK - NSDCPEFALRISK_GEN_ALL_CORE
For information on Fall & Injury Prevention, visit: [email or web address] OR  [email or web address] OR  https://www.cdc.gov/steadi/patient.html

## 2022-04-08 NOTE — DISCHARGE NOTE PROVIDER - HOSPITAL COURSE
32 YO WF 10 wks pregnant ad with extensive LLE DVT, pain and sx of hyperemesis gravidarum with concern of hypercoagulable state.  The pt is on IV heparin and will be transitioned to Lovenox for the duration of her pregnancy and 6 wks postpartum.  The pt was evaluated by Vasc surgery, Hem and OB.   Patient also to be discharged on nitrofurantoin for 1 week for asx bacteruria in pregnancy and to follow up with Dr. Ruiz in a week for  repeat UA and urine culture.     LLE pain and swelling due to extensive LLE acute DVT  N/V due to hyperemesis gravidarum  10 wk   mild anemia  R/O UTI    Venous duplex reviewed and  shows extensiv acute DVT of LLE in the CM, femoral, gastrocnemius, post tib, peroneal with superficial thrombophlebitis  Vasc surgery consult appreciated  Hem consult:  pt will need AC with Lovenox 1mg/kg/60mg BID for duration of pregnancy and 6 wks postpartum for total of 3mos, will need hypercoagulable w/u as there i s concern for underlying hypercoagulable state, ff up with Hem as out pt in 3 wks  OB Dr Arceo consult :  IV fluids/LR at 125ml/hr,  benadryl 25mg, Compazine 10mg BID, Zofran 4mg  q6 PRN, pyridoxine 25m,  "Banana Bag" QD, pepcid 20mg BID     Patient to be discharged on IV lovenox and finish nitrofurantoin course for asymptomatic bacteruria.

## 2022-04-08 NOTE — CHART NOTE - NSCHARTNOTEFT_GEN_A_CORE
patient has positive UA. Please start on appropriate antibiotics after discussing with the attending in am. Thanks

## 2022-04-08 NOTE — DISCHARGE NOTE PROVIDER - NSDCCPCAREPLAN_GEN_ALL_CORE_FT
PRINCIPAL DISCHARGE DIAGNOSIS  Diagnosis: DVT (deep vein thrombosis) in pregnancy  Assessment and Plan of Treatment: You have were admitted for lower extremity pain and on imaging were found to have extensive clots in your left lower extremitiy. We consulted the vascular, obstetric and hematology team and it is recommended to continue lovenox 60 mg subcutaneous injection twice a day. Please follow up with your hematologist and primary care physician on discharge.  Please take your prescribed medications as advised by your obstetrician for the excessive vomiting.  On admission your urinalysis was positive for a urinary tract infection and started you on an antibiotic (macrobid 100 mg bid x 7 days). Please follow up with your primary care physician in 1 week to have a repeat urine test to make sure the infection is cleared.  If you experience worsening symptoms please go to your nearest emergency department.

## 2022-04-09 VITALS
SYSTOLIC BLOOD PRESSURE: 110 MMHG | RESPIRATION RATE: 18 BRPM | DIASTOLIC BLOOD PRESSURE: 56 MMHG | TEMPERATURE: 97 F | HEART RATE: 103 BPM

## 2022-04-09 LAB
HCT VFR BLD CALC: 32.8 % — LOW (ref 37–47)
HGB BLD-MCNC: 10.8 G/DL — LOW (ref 12–16)
MCHC RBC-ENTMCNC: 30.3 PG — SIGNIFICANT CHANGE UP (ref 27–31)
MCHC RBC-ENTMCNC: 32.9 G/DL — SIGNIFICANT CHANGE UP (ref 32–37)
MCV RBC AUTO: 92.1 FL — SIGNIFICANT CHANGE UP (ref 81–99)
NRBC # BLD: 0 /100 WBCS — SIGNIFICANT CHANGE UP (ref 0–0)
PLATELET # BLD AUTO: 213 K/UL — SIGNIFICANT CHANGE UP (ref 130–400)
RBC # BLD: 3.56 M/UL — LOW (ref 4.2–5.4)
RBC # FLD: 12.3 % — SIGNIFICANT CHANGE UP (ref 11.5–14.5)
WBC # BLD: 10.41 K/UL — SIGNIFICANT CHANGE UP (ref 4.8–10.8)
WBC # FLD AUTO: 10.41 K/UL — SIGNIFICANT CHANGE UP (ref 4.8–10.8)

## 2022-04-09 RX ADMIN — Medication 10 MILLIGRAM(S): at 06:19

## 2022-04-09 RX ADMIN — Medication 25 MILLIGRAM(S): at 11:00

## 2022-04-09 RX ADMIN — ONDANSETRON 4 MILLIGRAM(S): 8 TABLET, FILM COATED ORAL at 02:40

## 2022-04-09 RX ADMIN — Medication 650 MILLIGRAM(S): at 02:37

## 2022-04-09 RX ADMIN — Medication 1 TABLET(S): at 11:03

## 2022-04-09 RX ADMIN — Medication 650 MILLIGRAM(S): at 11:12

## 2022-04-09 RX ADMIN — ENOXAPARIN SODIUM 60 MILLIGRAM(S): 100 INJECTION SUBCUTANEOUS at 06:18

## 2022-04-09 RX ADMIN — Medication 100 MILLIGRAM(S): at 06:19

## 2022-04-09 RX ADMIN — FAMOTIDINE 20 MILLIGRAM(S): 10 INJECTION INTRAVENOUS at 06:19

## 2022-04-14 DIAGNOSIS — I82.442 ACUTE EMBOLISM AND THROMBOSIS OF LEFT TIBIAL VEIN: ICD-10-CM

## 2022-04-14 DIAGNOSIS — O21.0 MILD HYPEREMESIS GRAVIDARUM: ICD-10-CM

## 2022-04-14 DIAGNOSIS — I82.452 ACUTE EMBOLISM AND THROMBOSIS OF LEFT PERONEAL VEIN: ICD-10-CM

## 2022-04-14 DIAGNOSIS — O22.31 DEEP PHLEBOTHROMBOSIS IN PREGNANCY, FIRST TRIMESTER: ICD-10-CM

## 2022-04-14 DIAGNOSIS — I82.412 ACUTE EMBOLISM AND THROMBOSIS OF LEFT FEMORAL VEIN: ICD-10-CM

## 2022-04-14 DIAGNOSIS — O99.011 ANEMIA COMPLICATING PREGNANCY, FIRST TRIMESTER: ICD-10-CM

## 2022-04-14 DIAGNOSIS — O22.91: ICD-10-CM

## 2022-04-14 DIAGNOSIS — D72.829 ELEVATED WHITE BLOOD CELL COUNT, UNSPECIFIED: ICD-10-CM

## 2022-04-14 DIAGNOSIS — I82.812 EMBOLISM AND THROMBOSIS OF SUPERFICIAL VEINS OF LEFT LOWER EXTREMITY: ICD-10-CM

## 2022-04-14 DIAGNOSIS — O99.111 OTHER DISEASES OF THE BLOOD AND BLOOD-FORMING ORGANS AND CERTAIN DISORDERS INVOLVING THE IMMUNE MECHANISM COMPLICATING PREGNANCY, FIRST TRIMESTER: ICD-10-CM

## 2022-04-14 DIAGNOSIS — Z3A.10 10 WEEKS GESTATION OF PREGNANCY: ICD-10-CM

## 2022-04-14 DIAGNOSIS — I82.462 ACUTE EMBOLISM AND THROMBOSIS OF LEFT CALF MUSCULAR VEIN: ICD-10-CM

## 2022-05-10 ENCOUNTER — APPOINTMENT (OUTPATIENT)
Dept: VASCULAR SURGERY | Facility: CLINIC | Age: 32
End: 2022-05-10
Payer: MEDICAID

## 2022-05-10 VITALS
DIASTOLIC BLOOD PRESSURE: 62 MMHG | WEIGHT: 130 LBS | HEIGHT: 66 IN | SYSTOLIC BLOOD PRESSURE: 85 MMHG | BODY MASS INDEX: 20.89 KG/M2

## 2022-05-10 PROCEDURE — 93970 EXTREMITY STUDY: CPT

## 2022-05-10 PROCEDURE — 99204 OFFICE O/P NEW MOD 45 MIN: CPT

## 2022-05-10 RX ORDER — ENOXAPARIN SODIUM 60 MG/.6ML
60 INJECTION SUBCUTANEOUS
Refills: 0 | Status: ACTIVE | COMMUNITY

## 2022-05-10 NOTE — HISTORY OF PRESENT ILLNESS
[FreeTextEntry1] : 31 y/o female who is currently 15 weeks pregnant, seen in ED for hyperemesis gravidum, left leg pain and swelling, found to have left lower extremity DVT, denies any prior h/o DVT, now on Lovenox for the past 4 weeks, leg swelling improving. C/o right leg discomfort. She c/o vaginal bleeding, was seen by OB.

## 2022-05-10 NOTE — ASSESSMENT
[FreeTextEntry1] : 31 y/o female currently 14 weeks pregnant, with LLE DVT, on Lovenox 60 mg twice a day since 4/6/22.\par \par Venous duplex showed no evidence of DVT in the right lower extremity, persistent occlusive DVT noted in the left EIV, CIV, femoral and popliteal veins.\par \par I recommend anticoagulation until the end of pregnancy. She is having vaginal bleeding and I will switch her to Lovenox 1.5 mg/kg/day dosing once a day instead of 1 mg/kg/day dosing twice a day. I will see her back in 3 months or sooner if she develops any new leg symptoms.\par \par I spent 45 minutes with patient performing physical exam, reviewing duplex results, discussing treatment plan and followup.\par

## 2022-05-10 NOTE — CONSULT LETTER
[Dear  ___] : Dear  [unfilled], [Consult Letter:] : I had the pleasure of evaluating your patient, [unfilled]. [Please see my note below.] : Please see my note below. [FreeTextEntry2] : Dear Dr. Trinh Ruiz,

## 2022-05-10 NOTE — DATA REVIEWED
[FreeTextEntry1] : I performed a venous duplex which was medically necessary to evaluate for DVT. It showed no evidence of DVT in the right lower extremity, persistent occlusive DVT noted in the left EIV, CIV, femoral and popliteal veins.\par

## 2022-05-23 NOTE — H&P ADULT - NSHPPHYSICALEXAM_GEN_ALL_CORE
PHYSICAL EXAM:  GEN: No acute distress  HEENT: Normocephalic  NECK: Supple  LUNGS: Decreased breathe sounds  HEART: Regular  ABD: Soft, non-tender, non-distended  EXT: Wrapped LLE  NEURO: AAOX3  PSYCH: Mood is appropriate, following commands
No

## 2022-05-27 ENCOUNTER — APPOINTMENT (OUTPATIENT)
Dept: HEMATOLOGY ONCOLOGY | Facility: CLINIC | Age: 32
End: 2022-05-27

## 2022-08-09 ENCOUNTER — APPOINTMENT (OUTPATIENT)
Dept: VASCULAR SURGERY | Facility: CLINIC | Age: 32
End: 2022-08-09

## 2022-08-09 VITALS — SYSTOLIC BLOOD PRESSURE: 106 MMHG | DIASTOLIC BLOOD PRESSURE: 71 MMHG

## 2022-08-09 VITALS — HEIGHT: 66 IN | BODY MASS INDEX: 23.14 KG/M2 | WEIGHT: 144 LBS

## 2022-08-09 PROCEDURE — 93971 EXTREMITY STUDY: CPT

## 2022-08-09 PROCEDURE — 99214 OFFICE O/P EST MOD 30 MIN: CPT

## 2022-08-09 NOTE — ASSESSMENT
[FreeTextEntry1] : 33 y/o female currently 14 weeks pregnant, with LLE DVT, on Lovenox 60 mg twice a day since 4/6/22.\par \par Venous duplex showed no evidence of DVT in the right lower extremity, partial recannulization of thrombus seen in the external iliac common femoral popliteal peroneal veins. There is persistent thrombus seen in the femoral vein at this time\par \par I recommend anticoagulation until the end of pregnancy. She is having vaginal bleeding and I will switch her to Lovenox 1.5 mg/kg/day dosing once a day instead of 1 mg/kg/day dosing twice a day. I will see her back in 3 months or sooner if she develops any new leg symptoms.\par \par \par

## 2022-08-09 NOTE — HISTORY OF PRESENT ILLNESS
[FreeTextEntry1] : 33 y/o female who is currently 15 weeks pregnant, seen in ED for hyperemesis gravidum, left leg pain and swelling, found to have left lower extremity DVT, denies any prior h/o DVT, now on Lovenox for the past 4 months leg swelling improving. C/o right leg discomfort. She c/o vaginal bleeding, was seen by OB.

## 2022-08-09 NOTE — DATA REVIEWED
[FreeTextEntry1] : venous duplex left leg DVT there is partial resolution of thrombus seen in the external iliac common femoral popliteal peroneal veins. His persistent thrombus at the level of the femoral vein.\par

## 2022-08-29 ENCOUNTER — APPOINTMENT (OUTPATIENT)
Dept: HEMATOLOGY ONCOLOGY | Facility: CLINIC | Age: 32
End: 2022-08-29

## 2022-08-29 VITALS
HEIGHT: 65 IN | OXYGEN SATURATION: 98 % | SYSTOLIC BLOOD PRESSURE: 118 MMHG | WEIGHT: 151 LBS | BODY MASS INDEX: 25.16 KG/M2 | TEMPERATURE: 97.3 F | DIASTOLIC BLOOD PRESSURE: 67 MMHG | HEART RATE: 87 BPM

## 2022-08-29 PROCEDURE — 99214 OFFICE O/P EST MOD 30 MIN: CPT

## 2022-08-30 ENCOUNTER — OUTPATIENT (OUTPATIENT)
Dept: OUTPATIENT SERVICES | Facility: HOSPITAL | Age: 32
LOS: 1 days | Discharge: HOME | End: 2022-08-30

## 2022-08-30 ENCOUNTER — APPOINTMENT (OUTPATIENT)
Dept: HEMATOLOGY ONCOLOGY | Facility: CLINIC | Age: 32
End: 2022-08-30

## 2022-08-30 DIAGNOSIS — Z00.00 ENCOUNTER FOR GENERAL ADULT MEDICAL EXAMINATION W/OUT ABNORMAL FINDINGS: ICD-10-CM

## 2022-08-31 LAB — FACT XA PPP-ACNC: 0.25 IU/ML

## 2022-08-31 NOTE — HISTORY OF PRESENT ILLNESS
[de-identified] : 32 year old female with no significant past medical history presents for DVT and thrombophilia workup. In April 2022 when she was 9 weeks pregnant, she went to [facility] ED for hyperemesis gravidum as well as leg pain.At that time she was spending most of her time in bed for several weeks because she was so sick from her pregnancy. Her hyperemesis was controlled and she was sent home. Two days later she was having excruciating pain in her leg, which she states caused her to faint. She was brought back to the ER and she was found to have extensive DVT in her left common femoral, femoral, popliteal, gastrocnemius venous sinus, left posterior tibial and peroneal veins as well as superficial thrombophlebitis of the left great saphenous and lesser saphenous veins. She was seen by vascular surgery and just recommended to start anticoagulation, lovenox 1mg/kg. She followed up with vascular surgery, and due to excessive vaginal bleeding she was changed from 1mg/kg BID to 1.5mg/kg QD with resolution of the bleeding. She had a partial hypercoagulable workup as an outpatient with her PMD which showed she was positive for Factor V Leiden and MTHFR, however it didn't specify heterozygosity or homozygosity. She does not have any known family history of clotting but she isn't entirely sure of her mother's medical history, although her mom did have both cervical and endometrial cancer. This is her 2nd pregnancy. Her first pregnancy she delivered at 34 weeks, she does not have a history of any miscarriages or other clots. She still has intermittent left leg swelling and discomfort. She is due to deliver November 3rd and plans to breast feed. She was seen by vascular who recommended to continue lovenox through February.\par \par FamHx: mom cervical ca 37 and uterine ca at 40\par SurgHx: none\par Allergies: none\par SocHx: quit 10 years ago smoked for about 5 years, \par Medications: iron pills, MWF\par \par OB: Dr. Dos Santos; [facility]

## 2022-08-31 NOTE — ASSESSMENT
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 Outpatient testing showed Factor V Leiden and MTHFR mutation  will need to repeat testing as it doesn't specify homozygosity vs heterozygosity , also check homocysteine level  \par \par \par \par PLAN: Continue with lovenox per vascular 1.5mg/kg QD due to excessive bleeding on 1mg/kg QD\par            Check anti-FactorXa activity\par            Will need to switch to unfractionated heparin prior to delivery \par            Continue therapeutic AC until at least 6 weeks post partum\par            Hypercoagulable workup after completion of AC (outside labs show possible Factor V Leiden and MTHFR)

## 2022-08-31 NOTE — REVIEW OF SYSTEMS
[Negative] : Allergic/Immunologic [FreeTextEntry6] : HOWARD  [FreeTextEntry9] : left lower extremity swelling

## 2022-08-31 NOTE — PHYSICAL EXAM
[Fully active, able to carry on all pre-disease performance without restriction] : Status 0 - Fully active, able to carry on all pre-disease performance without restriction [Normal] : grossly intact [de-identified] : pregnant [de-identified] : left lower extremity swelling 1+

## 2022-08-31 NOTE — PHYSICAL EXAM
[Fully active, able to carry on all pre-disease performance without restriction] : Status 0 - Fully active, able to carry on all pre-disease performance without restriction [Normal] : grossly intact [de-identified] : pregnant [de-identified] : left lower extremity swelling 1+

## 2022-08-31 NOTE — HISTORY OF PRESENT ILLNESS
[de-identified] : 32 year old female with no significant past medical history presents for DVT and thrombophilia workup. In April 2022 when she was 9 weeks pregnant, she went to [facility] ED for hyperemesis gravidum as well as leg pain.At that time she was spending most of her time in bed for several weeks because she was so sick from her pregnancy. Her hyperemesis was controlled and she was sent home. Two days later she was having excruciating pain in her leg, which she states caused her to faint. She was brought back to the ER and she was found to have extensive DVT in her left common femoral, femoral, popliteal, gastrocnemius venous sinus, left posterior tibial and peroneal veins as well as superficial thrombophlebitis of the left great saphenous and lesser saphenous veins. She was seen by vascular surgery and just recommended to start anticoagulation, lovenox 1mg/kg. She followed up with vascular surgery, and due to excessive vaginal bleeding she was changed from 1mg/kg BID to 1.5mg/kg QD with resolution of the bleeding. She had a partial hypercoagulable workup as an outpatient with her PMD which showed she was positive for Factor V Leiden and MTHFR, however it didn't specify heterozygosity or homozygosity. She does not have any known family history of clotting but she isn't entirely sure of her mother's medical history, although her mom did have both cervical and endometrial cancer. This is her 2nd pregnancy. Her first pregnancy she delivered at 34 weeks, she does not have a history of any miscarriages or other clots. She still has intermittent left leg swelling and discomfort. She is due to deliver November 3rd and plans to breast feed. She was seen by vascular who recommended to continue lovenox through February.\par \par FamHx: mom cervical ca 37 and uterine ca at 40\par SurgHx: none\par Allergies: none\par SocHx: quit 10 years ago smoked for about 5 years, \par Medications: iron pills, MWF\par \par OB: Dr. Dos Santos; [facility]

## 2022-09-01 DIAGNOSIS — I82.409 ACUTE EMBOLISM AND THROMBOSIS OF UNSPECIFIED DEEP VEINS OF UNSPECIFIED LOWER EXTREMITY: ICD-10-CM

## 2022-09-08 RX ORDER — ENOXAPARIN SODIUM 80 MG/.8ML
80 INJECTION, SOLUTION SUBCUTANEOUS DAILY
Qty: 30 | Refills: 4 | Status: ACTIVE | COMMUNITY
Start: 2022-05-10 | End: 1900-01-01

## 2022-11-26 ENCOUNTER — EMERGENCY (EMERGENCY)
Facility: HOSPITAL | Age: 32
LOS: 0 days | Discharge: HOME | End: 2022-11-26
Attending: EMERGENCY MEDICINE | Admitting: EMERGENCY MEDICINE

## 2022-11-26 VITALS
RESPIRATION RATE: 20 BRPM | TEMPERATURE: 102 F | OXYGEN SATURATION: 94 % | SYSTOLIC BLOOD PRESSURE: 110 MMHG | HEART RATE: 127 BPM | DIASTOLIC BLOOD PRESSURE: 81 MMHG | WEIGHT: 143.08 LBS

## 2022-11-26 VITALS
RESPIRATION RATE: 18 BRPM | TEMPERATURE: 100 F | DIASTOLIC BLOOD PRESSURE: 72 MMHG | OXYGEN SATURATION: 100 % | SYSTOLIC BLOOD PRESSURE: 123 MMHG | HEART RATE: 88 BPM

## 2022-11-26 DIAGNOSIS — R00.0 TACHYCARDIA, UNSPECIFIED: ICD-10-CM

## 2022-11-26 DIAGNOSIS — R07.81 PLEURODYNIA: ICD-10-CM

## 2022-11-26 DIAGNOSIS — Z86.718 PERSONAL HISTORY OF OTHER VENOUS THROMBOSIS AND EMBOLISM: ICD-10-CM

## 2022-11-26 DIAGNOSIS — R05.9 COUGH, UNSPECIFIED: ICD-10-CM

## 2022-11-26 DIAGNOSIS — Z86.2 PERSONAL HISTORY OF DISEASES OF THE BLOOD AND BLOOD-FORMING ORGANS AND CERTAIN DISORDERS INVOLVING THE IMMUNE MECHANISM: ICD-10-CM

## 2022-11-26 DIAGNOSIS — U07.1 COVID-19: ICD-10-CM

## 2022-11-26 DIAGNOSIS — Z28.310 UNVACCINATED FOR COVID-19: ICD-10-CM

## 2022-11-26 PROCEDURE — 71046 X-RAY EXAM CHEST 2 VIEWS: CPT | Mod: 26

## 2022-11-26 PROCEDURE — 99284 EMERGENCY DEPT VISIT MOD MDM: CPT

## 2022-11-26 PROCEDURE — 93010 ELECTROCARDIOGRAM REPORT: CPT

## 2022-11-26 RX ORDER — ACETAMINOPHEN 500 MG
975 TABLET ORAL ONCE
Refills: 0 | Status: COMPLETED | OUTPATIENT
Start: 2022-11-26 | End: 2022-11-26

## 2022-11-26 RX ADMIN — Medication 975 MILLIGRAM(S): at 14:06

## 2022-11-26 NOTE — ED PROVIDER NOTE - ATTENDING APP SHARED VISIT CONTRIBUTION OF CARE
Patient is a 32-year-old female unvaccinated against COVID who tested positive for COVID yesterday and today 3 hours ago developed a fever so came to the ED.  Notes cough productive of scant sputum and pain to bilateral lower ribs when she coughs.  She had COVID 2 years ago and developed bilateral pneumonia and had pleurisy with that as well so she was concerned that her pleuritic pain represented recurrence of the pneumonia.  She has a 6-week-old at home and her  is also positive.  She is on Lovenox for acute DVT    Exam: Tachycardia, lungs clear to auscultation, no increased work of breathing, no JVD or lower extremity edema, no acute distress  Plan: Chest x-ray, EKG, antipyretic

## 2022-11-26 NOTE — ED PROVIDER NOTE - NS ED ROS FT
Eyes:  No visual changes, eye pain or discharge.  ENMT:  No hearing changes, pain, discharge or infections. No neck pain or stiffness.  Cardiac:  No chest pain, SOB  edema  Respiratory:  + cough No respiratory distress. No hemoptysis. No history of asthma or RAD.  GI:  No nausea, vomiting, diarrhea or abdominal pain.  :  No dysuria, frequency or burning.  MS:  + rib pain No myalgia, muscle weakness, joint pain or back pain.  Neuro:  No headache or weakness.  No LOC.  Skin:  No skin rash.   Endocrine: No history of thyroid disease or diabetes.  Except as documented in the HPI,  all other systems are negative.

## 2022-11-26 NOTE — ED PROVIDER NOTE - PATIENT PORTAL LINK FT
You can access the FollowMyHealth Patient Portal offered by [facility] by registering at the following website: [email or web address]. By joining [employer]’s FollowMyHealth portal, you will also be able to view your health information using other applications (apps) compatible with our system.

## 2022-11-26 NOTE — ED PROVIDER NOTE - OBJECTIVE STATEMENT
Patient is a 32-year-old female currently 6 weeks postpartum, history of factor V Leiden diagnosed with a DVT in her left leg at 10 weeks gestation currently on Lovenox here for evaluation of bilateral rib pain, cough, fever and chills status post testing positive for COVID yesterday.  Patient denies chest pain, shortness of breath, abdominal pain, nausea, vomiting, diarrhea

## 2022-11-26 NOTE — ED PROVIDER NOTE - PROGRESS NOTE DETAILS
Vital signs improved after Tylenol patient very well-appearing offered Paxil event but declined at this time, instead given strict return precautions. Patient will reach out to her vascular doctor about possibly extending her Lovenox.

## 2022-12-12 ENCOUNTER — APPOINTMENT (OUTPATIENT)
Dept: HEMATOLOGY ONCOLOGY | Facility: CLINIC | Age: 32
End: 2022-12-12

## 2022-12-16 NOTE — ED ADULT TRIAGE NOTE - MODE OF ARRIVAL
[employer] in [facility] Sarcomatoid Histology Text: There were aggregates of squamous cells in a sarcomatous pattern.

## 2023-01-06 ENCOUNTER — EMERGENCY (EMERGENCY)
Facility: HOSPITAL | Age: 33
LOS: 0 days | Discharge: HOME | End: 2023-01-06
Attending: EMERGENCY MEDICINE | Admitting: EMERGENCY MEDICINE
Payer: MEDICAID

## 2023-01-06 VITALS
RESPIRATION RATE: 18 BRPM | OXYGEN SATURATION: 100 % | HEART RATE: 62 BPM | DIASTOLIC BLOOD PRESSURE: 51 MMHG | SYSTOLIC BLOOD PRESSURE: 131 MMHG | TEMPERATURE: 98 F

## 2023-01-06 DIAGNOSIS — R07.89 OTHER CHEST PAIN: ICD-10-CM

## 2023-01-06 DIAGNOSIS — R06.02 SHORTNESS OF BREATH: ICD-10-CM

## 2023-01-06 DIAGNOSIS — R11.0 NAUSEA: ICD-10-CM

## 2023-01-06 DIAGNOSIS — R10.13 EPIGASTRIC PAIN: ICD-10-CM

## 2023-01-06 DIAGNOSIS — Z20.822 CONTACT WITH AND (SUSPECTED) EXPOSURE TO COVID-19: ICD-10-CM

## 2023-01-06 DIAGNOSIS — Z86.718 PERSONAL HISTORY OF OTHER VENOUS THROMBOSIS AND EMBOLISM: ICD-10-CM

## 2023-01-06 LAB
ALBUMIN SERPL ELPH-MCNC: 4.2 G/DL — SIGNIFICANT CHANGE UP (ref 3.5–5.2)
ALP SERPL-CCNC: 65 U/L — SIGNIFICANT CHANGE UP (ref 30–115)
ALT FLD-CCNC: 15 U/L — SIGNIFICANT CHANGE UP (ref 0–41)
ANION GAP SERPL CALC-SCNC: 11 MMOL/L — SIGNIFICANT CHANGE UP (ref 7–14)
AST SERPL-CCNC: 18 U/L — SIGNIFICANT CHANGE UP (ref 0–41)
BASOPHILS # BLD AUTO: 0.04 K/UL — SIGNIFICANT CHANGE UP (ref 0–0.2)
BASOPHILS NFR BLD AUTO: 0.5 % — SIGNIFICANT CHANGE UP (ref 0–1)
BILIRUB SERPL-MCNC: 0.4 MG/DL — SIGNIFICANT CHANGE UP (ref 0.2–1.2)
BUN SERPL-MCNC: 11 MG/DL — SIGNIFICANT CHANGE UP (ref 10–20)
CALCIUM SERPL-MCNC: 9.3 MG/DL — SIGNIFICANT CHANGE UP (ref 8.4–10.5)
CHLORIDE SERPL-SCNC: 99 MMOL/L — SIGNIFICANT CHANGE UP (ref 98–110)
CO2 SERPL-SCNC: 25 MMOL/L — SIGNIFICANT CHANGE UP (ref 17–32)
CREAT SERPL-MCNC: 0.6 MG/DL — LOW (ref 0.7–1.5)
D DIMER BLD IA.RAPID-MCNC: <150 NG/ML DDU — SIGNIFICANT CHANGE UP
EGFR: 121 ML/MIN/1.73M2 — SIGNIFICANT CHANGE UP
EOSINOPHIL # BLD AUTO: 0.05 K/UL — SIGNIFICANT CHANGE UP (ref 0–0.7)
EOSINOPHIL NFR BLD AUTO: 0.6 % — SIGNIFICANT CHANGE UP (ref 0–8)
GLUCOSE SERPL-MCNC: 110 MG/DL — HIGH (ref 70–99)
HCG SERPL QL: NEGATIVE — SIGNIFICANT CHANGE UP
HCT VFR BLD CALC: 39.4 % — SIGNIFICANT CHANGE UP (ref 37–47)
HGB BLD-MCNC: 13.1 G/DL — SIGNIFICANT CHANGE UP (ref 12–16)
IMM GRANULOCYTES NFR BLD AUTO: 0.4 % — HIGH (ref 0.1–0.3)
LACTATE SERPL-SCNC: 1.5 MMOL/L — SIGNIFICANT CHANGE UP (ref 0.7–2)
LIDOCAIN IGE QN: 33 U/L — SIGNIFICANT CHANGE UP (ref 7–60)
LYMPHOCYTES # BLD AUTO: 3.19 K/UL — SIGNIFICANT CHANGE UP (ref 1.2–3.4)
LYMPHOCYTES # BLD AUTO: 37.7 % — SIGNIFICANT CHANGE UP (ref 20.5–51.1)
MCHC RBC-ENTMCNC: 30.8 PG — SIGNIFICANT CHANGE UP (ref 27–31)
MCHC RBC-ENTMCNC: 33.2 G/DL — SIGNIFICANT CHANGE UP (ref 32–37)
MCV RBC AUTO: 92.7 FL — SIGNIFICANT CHANGE UP (ref 81–99)
MONOCYTES # BLD AUTO: 0.55 K/UL — SIGNIFICANT CHANGE UP (ref 0.1–0.6)
MONOCYTES NFR BLD AUTO: 6.5 % — SIGNIFICANT CHANGE UP (ref 1.7–9.3)
NEUTROPHILS # BLD AUTO: 4.61 K/UL — SIGNIFICANT CHANGE UP (ref 1.4–6.5)
NEUTROPHILS NFR BLD AUTO: 54.3 % — SIGNIFICANT CHANGE UP (ref 42.2–75.2)
NRBC # BLD: 0 /100 WBCS — SIGNIFICANT CHANGE UP (ref 0–0)
PLATELET # BLD AUTO: 204 K/UL — SIGNIFICANT CHANGE UP (ref 130–400)
POTASSIUM SERPL-MCNC: 3.4 MMOL/L — LOW (ref 3.5–5)
POTASSIUM SERPL-SCNC: 3.4 MMOL/L — LOW (ref 3.5–5)
PROT SERPL-MCNC: 6.8 G/DL — SIGNIFICANT CHANGE UP (ref 6–8)
RBC # BLD: 4.25 M/UL — SIGNIFICANT CHANGE UP (ref 4.2–5.4)
RBC # FLD: 13.3 % — SIGNIFICANT CHANGE UP (ref 11.5–14.5)
SARS-COV-2 RNA SPEC QL NAA+PROBE: SIGNIFICANT CHANGE UP
SODIUM SERPL-SCNC: 135 MMOL/L — SIGNIFICANT CHANGE UP (ref 135–146)
TROPONIN T SERPL-MCNC: <0.01 NG/ML — SIGNIFICANT CHANGE UP
WBC # BLD: 8.47 K/UL — SIGNIFICANT CHANGE UP (ref 4.8–10.8)
WBC # FLD AUTO: 8.47 K/UL — SIGNIFICANT CHANGE UP (ref 4.8–10.8)

## 2023-01-06 PROCEDURE — 76705 ECHO EXAM OF ABDOMEN: CPT | Mod: 26

## 2023-01-06 PROCEDURE — 71045 X-RAY EXAM CHEST 1 VIEW: CPT | Mod: 26

## 2023-01-06 PROCEDURE — 99285 EMERGENCY DEPT VISIT HI MDM: CPT

## 2023-01-06 PROCEDURE — 93010 ELECTROCARDIOGRAM REPORT: CPT

## 2023-01-06 RX ORDER — FAMOTIDINE 10 MG/ML
20 INJECTION INTRAVENOUS ONCE
Refills: 0 | Status: COMPLETED | OUTPATIENT
Start: 2023-01-06 | End: 2023-01-06

## 2023-01-06 RX ORDER — ONDANSETRON 8 MG/1
4 TABLET, FILM COATED ORAL ONCE
Refills: 0 | Status: COMPLETED | OUTPATIENT
Start: 2023-01-06 | End: 2023-01-06

## 2023-01-06 RX ORDER — SODIUM CHLORIDE 9 MG/ML
1000 INJECTION INTRAMUSCULAR; INTRAVENOUS; SUBCUTANEOUS ONCE
Refills: 0 | Status: COMPLETED | OUTPATIENT
Start: 2023-01-06 | End: 2023-01-06

## 2023-01-06 RX ADMIN — FAMOTIDINE 20 MILLIGRAM(S): 10 INJECTION INTRAVENOUS at 15:25

## 2023-01-06 RX ADMIN — ONDANSETRON 4 MILLIGRAM(S): 8 TABLET, FILM COATED ORAL at 15:25

## 2023-01-06 RX ADMIN — SODIUM CHLORIDE 1000 MILLILITER(S): 9 INJECTION INTRAMUSCULAR; INTRAVENOUS; SUBCUTANEOUS at 15:25

## 2023-01-06 NOTE — ED PROVIDER NOTE - NSFOLLOWUPINSTRUCTIONS_ED_ALL_ED_FT
Our Emergency Department Referral Coordinators will be reaching out ot you in the next 24-48 hours from 9:00am to 5:00pm (Monday to Friday) with a follow up appointment. Please expect a phone call from the hospital in that time frame. If you do not receive a call or if you have any questions or concerns, you can reach them at [phone] or [phone].    Follow-up chest x-ray findings with Dr. Ruiz.  She may wish to repeat the chest x-ray at a later date.  Take Tylenol, Advil, or Aleve for pain.  Avoid fatty foods.              Biliary Colic, Adult       Biliary colic is severe pain caused by a problem with the gallbladder. The gallbladder is a small organ in the upper right part of the abdomen. The gallbladder stores a digestive fluid produced in the liver (bile) that helps the body break down fat. Bile and other digestive enzymes are carried from the liver to the small intestine through tube-like structures called bile ducts. The gallbladder and the bile ducts form the biliary tract.    Sometimes, hard deposits of digestive fluids (gallstones) form in the gallbladder and block the flow of bile from the gallbladder, causing biliary colic. This condition is also called a gallbladder attack. Gallstones can be as small as a grain of sand or as big as a golf ball. There could be just one gallstone in the gallbladder, or there could be many.      What are the causes?    This condition is usually caused by gallstones. Less often, a tumor could block the flow of bile from the gallbladder and trigger biliary colic.      What increases the risk?    The following factors may make you more likely to develop this condition:  •Being female.      •Having a family history of gallstones.      •Being obese.      •Losing weight suddenly or quickly.      •Eating a diet that is high in calories, low in fiber, and rich in refined carbohydrates, such as white bread and white rice.    •Having certain health conditions, such as:  •An intestinal disease that affects nutrient absorption, such as Crohn's disease.      •A metabolic condition, such as diabetes or metabolic syndrome. Metabolic syndrome occurs when someone has high blood pressure, high cholesterol, and diabetes.      •A blood condition, such as hemolytic anemia or sickle cell disease.          What are the signs or symptoms?    The main symptom of this condition is severe pain in the upper right side of the abdomen. You may feel this pain below the chest but above the hip. This pain often occurs at night or after eating a meal that is high in fat. This pain may get worse for up to an hour and last as long as 12 hours. In most cases, the pain fades (subsides) within 2 hours.    Other symptoms of this condition include:  •Nausea and vomiting.      •Pain under the right shoulder.        How is this diagnosed?    This condition is diagnosed based on your medical history, your symptoms, and a physical exam.    You may also have tests, including:  •Blood tests to rule out infection or inflammation of the bile ducts, gallbladder, pancreas, or liver.    •Imaging studies, such as:  •An ultrasound.      •A CT scan.      •An MRI.        In some cases, you may need to have an imaging study done using a small amount of radioactive material (nuclear medicine) to confirm the diagnosis.      How is this treated?    This condition may be treated with medicines to:  •Relieve your pain or nausea.      •Dissolve the gallstones. It may take months or years before the gallstones are completely gone.      If you have gallstones, or if you have a tumor in the gallbladder that is causing biliary colic, you may need surgery to remove the gallbladder (cholecystectomy).      Follow these instructions at home:    Eating and drinking     •Drink enough fluid to keep your urine pale yellow.    •Follow instructions from your health care provider about eating or drinking restrictions. These may include avoiding:  •Fatty, greasy, and fried foods.      •Any foods that make the pain worse.      •Overeating.      •Having a large meal after not eating for a while.        General instructions     •Take over-the-counter and prescription medicines only as told by your health care provider.      •Keep all follow-up visits as told by your health care provider. This is important.        How is this prevented?    Steps to prevent this condition include:  •Maintaining a healthy body weight.      •Getting regular exercise.      •Eating a healthy diet that is high in fiber and low in fat.      •Limiting how much sugar and refined carbohydrates you eat.        Contact a health care provider if:    •Your pain lasts more than 5 hours.      •You vomit.      •You have a fever and chills.      •Your pain gets worse.        Get help right away if:    •Your skin or the whites of your eyes look yellow (jaundice).      •Your have tea-colored urine and light-colored stools (feces).      •You are dizzy or you faint.        Summary    •Biliary colic is severe pain caused by a problem with the gallbladder. The gallbladder is a small organ in the upper right part of your abdomen.      •Treatment for this condition may include medicine to relieve your pain or nausea, or medicine to slowly dissolve the gallstones.      •If you have gallstones, or if you have a tumor in the gallbladder that is causing biliary colic, you may need surgery to remove the gallbladder (cholecystectomy).      This information is not intended to replace advice given to you by your health care provider. Make sure you discuss any questions you have with your health care provider.

## 2023-01-06 NOTE — ED PROVIDER NOTE - ATTENDING APP SHARED VISIT CONTRIBUTION OF CARE
Patient complains of sharp lower chest pain.  She is concerned about a clot in her lungs since she had a clot in her leg sometime ago.  She is no longer on anticoagulation for this condition.  The DVT she had in the past was likely provoked during pregnancy.  Vital signs noted.  No apparent distress.  Sclera are white.  Chest is clear.  Heart regular rate no murmur or rub.  Abdomen shows right upper quadrant tenderness to palpation.  Diagnostic testing reviewed.  D-dimer is negative.  Troponin is negative.  EKG shows signs of early repolarization, but no indication of injury or ischemia.  Gallbladder sono shows cholecystolithiasis with no signs of cholecystitis.  Patient's symptoms may be due to biliary colic since the pain started not long after having some potato salad.  Patient's symptoms are reduced in the emergency department.  In my opinion, outpatient management and treatment is medically appropriate.  Copies of all diagnostic testing provided to patient to aid in follow-up.

## 2023-01-06 NOTE — ED PROVIDER NOTE - NSPTACCESSSVCSAPPTDETAILS_ED_ALL_ED_FT
Patient presents with epigastric pain.  Emergency department work-up consistent with biliary colic.  Needs outpatient follow-up optimal follow-up within 2 weeks.

## 2023-01-06 NOTE — ED PROVIDER NOTE - PHYSICAL EXAMINATION
Gen: Alert, NAD, well appearing  Head: NC, AT, PERRL, EOMI, normal lids/conjunctiva  ENT: normal hearing  Neck: +supple, no tenderness/meningismus,  Pulm: Bilateral BS, normal resp effort, no wheeze/stridor/retractions  CV: RRR  Abd: soft, +tender to palpation over RUQ/epigastrium  Mskel: no edema/erythema/cyanosis  Skin: no rash, warm/dry  Neuro: AAOx3, no sensory/motor deficits

## 2023-01-06 NOTE — ED PROVIDER NOTE - DIFFERENTIAL DIAGNOSIS
ACS, pulmonary embolism, cholecystitis, biliary colic contemplated.  ACS, pulmonary embolism very unlikely in view of the results diagnostic testing.  Acute cholecystitis not likely in view sonographic findings and patient's improving symptoms. Differential Diagnosis

## 2023-01-06 NOTE — ED PROVIDER NOTE - PATIENT PORTAL LINK FT
You can access the FollowMyHealth Patient Portal offered by [facility] by registering at the following website: [email or web address]. By joining [employer]’s FollowMyHealth portal, you will also be able to view your health information using other applications (apps) compatible with our system.

## 2023-01-06 NOTE — ED PROVIDER NOTE - PROGRESS NOTE DETAILS
Patient feeling better. Advised of xray findings and need for repeat xray. Copies of results given to patient.

## 2023-01-06 NOTE — ED PROVIDER NOTE - OBJECTIVE STATEMENT
32-year-old female with history of DVT in April 2022 (no longer on anticoagulation) complaining of sharp chest pain and shortness of breath for the past 25 minutes. + Nausea and epigastric pain.  Pain started while she was eating potato salad.  No fevers.

## 2023-01-10 PROBLEM — I82.409 ACUTE EMBOLISM AND THROMBOSIS OF UNSPECIFIED DEEP VEINS OF UNSPECIFIED LOWER EXTREMITY: Chronic | Status: ACTIVE | Noted: 2023-01-06

## 2023-01-12 ENCOUNTER — APPOINTMENT (OUTPATIENT)
Dept: VASCULAR SURGERY | Facility: CLINIC | Age: 33
End: 2023-01-12
Payer: MEDICAID

## 2023-01-12 PROCEDURE — 99212 OFFICE O/P EST SF 10 MIN: CPT

## 2023-01-12 PROCEDURE — 93971 EXTREMITY STUDY: CPT

## 2023-01-12 NOTE — DATA REVIEWED
[FreeTextEntry1] : Venous duplex of left leg showed that there are chronic occlusive DVT in the left CIV, chronic changes seen in the common femoral popliteal peroneal veins, partial recanalization of thrombus in the external iliac and femoral vein.

## 2023-01-12 NOTE — ASSESSMENT
[FreeTextEntry1] : 31 y/o female with LLE DVT during pregnancy, was on Lovenox until December 2022.\par \par Venous duplex of left leg showed that there are chronic occlusive DVT in the left CIV, chronic changes seen in the common femoral popliteal peroneal veins, partial recanalization of thrombus in the external iliac and femoral vein.\par \par She has followup with Hematology for repeat hypercoagulable testing as she had tested positive for Factor V Leiden and MTHFR gene mutation.\par \par I will see her back in six months time.\par \par \par

## 2023-01-12 NOTE — CONSULT LETTER
[Dear  ___] : Dear  [unfilled], [Courtesy Letter:] : I had the pleasure of seeing your patient, [unfilled], in my office today. [Please see my note below.] : Please see my note below. [FreeTextEntry2] : Dear Dr. Trinh Ruiz,

## 2023-01-12 NOTE — HISTORY OF PRESENT ILLNESS
[FreeTextEntry1] : 31 y/o female who was found to have left lower extremity DVT during pregnancy, denies any prior h/o DVT, was on Lovenox that she stopped 4 weeks ago, took it until 7 weeks after delivery. \par \par Denies any new leg swelling or pain, was told by Hematology that she was positive for Factor V Leiden and MTHFR gene mutation, and needed repeated testing when off anticoagulation.

## 2023-01-25 ENCOUNTER — APPOINTMENT (OUTPATIENT)
Dept: SURGERY | Facility: CLINIC | Age: 33
End: 2023-01-25
Payer: MEDICAID

## 2023-01-25 ENCOUNTER — NON-APPOINTMENT (OUTPATIENT)
Age: 33
End: 2023-01-25

## 2023-01-25 VITALS
DIASTOLIC BLOOD PRESSURE: 66 MMHG | BODY MASS INDEX: 23.16 KG/M2 | WEIGHT: 139 LBS | OXYGEN SATURATION: 99 % | SYSTOLIC BLOOD PRESSURE: 98 MMHG | TEMPERATURE: 97.4 F | HEIGHT: 65 IN | HEART RATE: 91 BPM

## 2023-01-25 PROCEDURE — 99204 OFFICE O/P NEW MOD 45 MIN: CPT

## 2023-01-25 NOTE — HISTORY OF PRESENT ILLNESS
[de-identified] : Ms. JEREMIAS HILARIO is a 32 year  year old woman. She presented to the office for the first time on 01/25/2023 , her primary is CHRIS RUELAS .\par \par She presented to the office with h/o epigastric pain and right upper quadrant pain after eating potato salad.\par Her us gallstones , cbd is normal .\par lfts are normal \par She has facgtor 5 leidean is 12 weeks post partum . When she was 10 week pregnant was started on lovenox and stopped it 1 month ago . \par \par spoke with vascular \par will see Odaimi next week. \par Will schedule for lap/ robo javier

## 2023-01-25 NOTE — ASSESSMENT
[FreeTextEntry1] : Ms. JEREMIAS HILARIO is a 32 year  year old woman. She presented to the office for the first time on 01/25/2023 , her primary is CHRIS RUELAS .\par \par She presented to the office with h/o epigastric pain and right upper quadrant pain after eating potato salad.\par Her us gallstones , cbd is normal .\par lfts are normal \par She has facgtor 5 leidean is 12 weeks post partum . When she was 10 week pregnant was started on lovenox and stopped it 1 month ago . \par \par \par impression : biliary colic. \par \par spoke with vascular \par will see Odaimi next week. \par Will schedule for lap/ robo javier\par will get her dvt prophylaxis pre op \par \par We explained in great detail the pathophysiology of the disease process. We discussed the workup for diagnosis and management. The Post operative care was explained to the patient. He was counselled on diet , exercise and wound care. The Procedure was explained to the patient. The Risk , benefit and alternatives were discussed. We discussed recovery and possible complications. We discussed complications including sever complications like injury of the surrounding organs like the bile duct. We discussed about the benefits and disadvantage of converting the laparoscopic surgery to open. We also discussed about post cholecystectomy syndrome and  symptom management after surgery.\par \par We discussed for over 45 min . \par We discussed the importance of close follow up. \par We informed that she needs to follow up in OR\par We also informed that she can call us if anything changes or has any questions.\par \par .

## 2023-01-25 NOTE — PHYSICAL EXAM
[JVD] : no jugular venous distention  [Purpura] : no purpura  [Alert] : alert [Calm] : calm [de-identified] : Normal  [de-identified] : Normal  [de-identified] : Normal

## 2023-01-26 ENCOUNTER — NON-APPOINTMENT (OUTPATIENT)
Age: 33
End: 2023-01-26

## 2023-01-31 ENCOUNTER — EMERGENCY (EMERGENCY)
Facility: HOSPITAL | Age: 33
LOS: 0 days | Discharge: HOME | End: 2023-01-31
Attending: EMERGENCY MEDICINE | Admitting: EMERGENCY MEDICINE
Payer: MEDICAID

## 2023-01-31 VITALS
TEMPERATURE: 96 F | WEIGHT: 139.99 LBS | SYSTOLIC BLOOD PRESSURE: 123 MMHG | HEART RATE: 104 BPM | RESPIRATION RATE: 20 BRPM | DIASTOLIC BLOOD PRESSURE: 58 MMHG | OXYGEN SATURATION: 98 %

## 2023-01-31 VITALS — HEART RATE: 88 BPM

## 2023-01-31 DIAGNOSIS — Z86.718 PERSONAL HISTORY OF OTHER VENOUS THROMBOSIS AND EMBOLISM: ICD-10-CM

## 2023-01-31 DIAGNOSIS — R10.11 RIGHT UPPER QUADRANT PAIN: ICD-10-CM

## 2023-01-31 DIAGNOSIS — K80.50 CALCULUS OF BILE DUCT WITHOUT CHOLANGITIS OR CHOLECYSTITIS WITHOUT OBSTRUCTION: ICD-10-CM

## 2023-01-31 DIAGNOSIS — D68.2 HEREDITARY DEFICIENCY OF OTHER CLOTTING FACTORS: ICD-10-CM

## 2023-01-31 DIAGNOSIS — Z79.01 LONG TERM (CURRENT) USE OF ANTICOAGULANTS: ICD-10-CM

## 2023-01-31 DIAGNOSIS — R10.13 EPIGASTRIC PAIN: ICD-10-CM

## 2023-01-31 DIAGNOSIS — R11.2 NAUSEA WITH VOMITING, UNSPECIFIED: ICD-10-CM

## 2023-01-31 DIAGNOSIS — Z86.2 PERSONAL HISTORY OF DISEASES OF THE BLOOD AND BLOOD-FORMING ORGANS AND CERTAIN DISORDERS INVOLVING THE IMMUNE MECHANISM: ICD-10-CM

## 2023-01-31 DIAGNOSIS — Z87.19 PERSONAL HISTORY OF OTHER DISEASES OF THE DIGESTIVE SYSTEM: ICD-10-CM

## 2023-01-31 LAB
ALBUMIN SERPL ELPH-MCNC: 4.5 G/DL — SIGNIFICANT CHANGE UP (ref 3.5–5.2)
ALP SERPL-CCNC: 64 U/L — SIGNIFICANT CHANGE UP (ref 30–115)
ALT FLD-CCNC: 13 U/L — SIGNIFICANT CHANGE UP (ref 0–41)
ANION GAP SERPL CALC-SCNC: 11 MMOL/L — SIGNIFICANT CHANGE UP (ref 7–14)
AST SERPL-CCNC: 17 U/L — SIGNIFICANT CHANGE UP (ref 0–41)
BASOPHILS # BLD AUTO: 0.03 K/UL — SIGNIFICANT CHANGE UP (ref 0–0.2)
BASOPHILS NFR BLD AUTO: 0.5 % — SIGNIFICANT CHANGE UP (ref 0–1)
BILIRUB SERPL-MCNC: 0.5 MG/DL — SIGNIFICANT CHANGE UP (ref 0.2–1.2)
BUN SERPL-MCNC: 12 MG/DL — SIGNIFICANT CHANGE UP (ref 10–20)
CALCIUM SERPL-MCNC: 9.5 MG/DL — SIGNIFICANT CHANGE UP (ref 8.4–10.5)
CHLORIDE SERPL-SCNC: 101 MMOL/L — SIGNIFICANT CHANGE UP (ref 98–110)
CO2 SERPL-SCNC: 26 MMOL/L — SIGNIFICANT CHANGE UP (ref 17–32)
CREAT SERPL-MCNC: 0.6 MG/DL — LOW (ref 0.7–1.5)
EGFR: 121 ML/MIN/1.73M2 — SIGNIFICANT CHANGE UP
EOSINOPHIL # BLD AUTO: 0.04 K/UL — SIGNIFICANT CHANGE UP (ref 0–0.7)
EOSINOPHIL NFR BLD AUTO: 0.7 % — SIGNIFICANT CHANGE UP (ref 0–8)
GLUCOSE SERPL-MCNC: 109 MG/DL — HIGH (ref 70–99)
HCG SERPL QL: NEGATIVE — SIGNIFICANT CHANGE UP
HCT VFR BLD CALC: 41.8 % — SIGNIFICANT CHANGE UP (ref 37–47)
HGB BLD-MCNC: 13.5 G/DL — SIGNIFICANT CHANGE UP (ref 12–16)
IMM GRANULOCYTES NFR BLD AUTO: 0.2 % — SIGNIFICANT CHANGE UP (ref 0.1–0.3)
LIDOCAIN IGE QN: 29 U/L — SIGNIFICANT CHANGE UP (ref 7–60)
LYMPHOCYTES # BLD AUTO: 2.31 K/UL — SIGNIFICANT CHANGE UP (ref 1.2–3.4)
LYMPHOCYTES # BLD AUTO: 38.9 % — SIGNIFICANT CHANGE UP (ref 20.5–51.1)
MCHC RBC-ENTMCNC: 30 PG — SIGNIFICANT CHANGE UP (ref 27–31)
MCHC RBC-ENTMCNC: 32.3 G/DL — SIGNIFICANT CHANGE UP (ref 32–37)
MCV RBC AUTO: 92.9 FL — SIGNIFICANT CHANGE UP (ref 81–99)
MONOCYTES # BLD AUTO: 0.35 K/UL — SIGNIFICANT CHANGE UP (ref 0.1–0.6)
MONOCYTES NFR BLD AUTO: 5.9 % — SIGNIFICANT CHANGE UP (ref 1.7–9.3)
NEUTROPHILS # BLD AUTO: 3.2 K/UL — SIGNIFICANT CHANGE UP (ref 1.4–6.5)
NEUTROPHILS NFR BLD AUTO: 53.8 % — SIGNIFICANT CHANGE UP (ref 42.2–75.2)
NRBC # BLD: 0 /100 WBCS — SIGNIFICANT CHANGE UP (ref 0–0)
PLATELET # BLD AUTO: 181 K/UL — SIGNIFICANT CHANGE UP (ref 130–400)
POTASSIUM SERPL-MCNC: 3.7 MMOL/L — SIGNIFICANT CHANGE UP (ref 3.5–5)
POTASSIUM SERPL-SCNC: 3.7 MMOL/L — SIGNIFICANT CHANGE UP (ref 3.5–5)
PROT SERPL-MCNC: 6.9 G/DL — SIGNIFICANT CHANGE UP (ref 6–8)
RBC # BLD: 4.5 M/UL — SIGNIFICANT CHANGE UP (ref 4.2–5.4)
RBC # FLD: 13 % — SIGNIFICANT CHANGE UP (ref 11.5–14.5)
SODIUM SERPL-SCNC: 138 MMOL/L — SIGNIFICANT CHANGE UP (ref 135–146)
WBC # BLD: 5.94 K/UL — SIGNIFICANT CHANGE UP (ref 4.8–10.8)
WBC # FLD AUTO: 5.94 K/UL — SIGNIFICANT CHANGE UP (ref 4.8–10.8)

## 2023-01-31 PROCEDURE — 99284 EMERGENCY DEPT VISIT MOD MDM: CPT

## 2023-01-31 PROCEDURE — 76705 ECHO EXAM OF ABDOMEN: CPT | Mod: 26

## 2023-01-31 RX ORDER — SODIUM CHLORIDE 9 MG/ML
1000 INJECTION, SOLUTION INTRAVENOUS ONCE
Refills: 0 | Status: COMPLETED | OUTPATIENT
Start: 2023-01-31 | End: 2023-01-31

## 2023-01-31 RX ORDER — KETOROLAC TROMETHAMINE 30 MG/ML
15 SYRINGE (ML) INJECTION ONCE
Refills: 0 | Status: DISCONTINUED | OUTPATIENT
Start: 2023-01-31 | End: 2023-01-31

## 2023-01-31 RX ORDER — ONDANSETRON 8 MG/1
1 TABLET, FILM COATED ORAL
Qty: 8 | Refills: 0
Start: 2023-01-31 | End: 2023-02-03

## 2023-01-31 RX ORDER — ONDANSETRON 8 MG/1
4 TABLET, FILM COATED ORAL ONCE
Refills: 0 | Status: COMPLETED | OUTPATIENT
Start: 2023-01-31 | End: 2023-01-31

## 2023-01-31 RX ORDER — KETOROLAC TROMETHAMINE 30 MG/ML
1 SYRINGE (ML) INJECTION
Qty: 9 | Refills: 0
Start: 2023-01-31 | End: 2023-02-02

## 2023-01-31 RX ADMIN — SODIUM CHLORIDE 1000 MILLILITER(S): 9 INJECTION, SOLUTION INTRAVENOUS at 15:51

## 2023-01-31 RX ADMIN — ONDANSETRON 4 MILLIGRAM(S): 8 TABLET, FILM COATED ORAL at 15:52

## 2023-01-31 RX ADMIN — Medication 15 MILLIGRAM(S): at 15:52

## 2023-01-31 NOTE — ED PROVIDER NOTE - PHYSICAL EXAMINATION
GENERAL: NAD   SKIN: warm, dry  CARD: S1, S2 normal; no murmurs, gallops, or rubs. Regular rate and rhythm.   RESP: LCTAB; No wheezes, rales, rhonchi, or stridor.  ABD: RUQ and epigastric TTP. Soft and nondistended   NEURO: Alert, oriented, grossly unremarkable  PSYCH: Cooperative, appropriate.

## 2023-01-31 NOTE — ED PROVIDER NOTE - PATIENT PORTAL LINK FT
You can access the FollowMyHealth Patient Portal offered by [facility] by registering at the following website: [email or web address]. By joining [employer]’s FollowMyHealth portal, you will also be able to view your health information using other applications (apps) compatible with our system.

## 2023-01-31 NOTE — ED PROVIDER NOTE - NS ED ROS FT
Constitutional: No fevers, chills, or malaise.  Cardiac:  No chest pain, SOB  Respiratory:  No cough  GI: Reports nausea, vomiting, diarrhea, and abdominal pain.  :  No dysuria, frequency, or urgency.  Skin:  No skin rash.

## 2023-01-31 NOTE — ED ADULT TRIAGE NOTE - WEIGHT METHOD
The patient has been examined and the H&P has been reviewed:    I concur with the findings and no changes have occurred since H&P was written.    Anesthesia/Surgery risks, benefits and alternative options discussed and understood by patient/family.          Active Hospital Problems    Diagnosis  POA    Malignant neoplasm of left breast in female, estrogen receptor positive [C50.912, Z17.0]  Not Applicable      Resolved Hospital Problems   No resolved problems to display.      stated

## 2023-01-31 NOTE — ED PROVIDER NOTE - CARE PROVIDER_API CALL
Trinh Ruiz)  Medicine  [address] [place] [address] [address]  [place] [address]  Phone: [phone]  Fax: [phone]  Established Patient  Follow Up Time: 1-3 Days    Clayton Saldivar (JOAQUINA)  Surgery  [address] [place] [address], [address] [place] [address]  Phone: [phone]  Fax: [phone]  Established Patient  Follow Up Time: 1-3 Days

## 2023-01-31 NOTE — ED PROVIDER NOTE - PROVIDER TOKENS
PROVIDER:[TOKEN:[[id number]],FOLLOWUP:[1-3 Days],ESTABLISHEDPATIENT:[T]],PROVIDER:[TOKEN:[[id number]],FOLLOWUP:[1-3 Days],ESTABLISHEDPATIENT:[T]]

## 2023-01-31 NOTE — ED ADULT NURSE NOTE - IS THE PATIENT ABLE TO BE SCREENED?
Postpartum Day 2    Patient Name:  Sola Akers  :  1991  MRN:  [id number]      Assessment:  Normal postpartum course.     Plan:  Continue current care. Plan to discharge this noelle if BP continues to be stable on Labetalol 100 mg twice a day.     Subjective:  The patient feels well:  Voiding without difficulty, lochia normal, tolerating normal diet, and passing flatus.  Pain is well controlled with current medications. She denies any headache, vision changes or RUQ pain. She also denies any dizziness or shortness of breath. The patient has no emotional concerns.  The baby is well.    Objective:  BP (!) 124/90   Pulse 96   Temp 98.4  F (36.9  C) (Oral)   Resp 16   Ht 1.524 m (5')   Wt 115.2 kg (254 lb)   LMP 10/25/2020   SpO2 97%   Breastfeeding Unknown   BMI 49.61 kg/m    Patient Vitals for the past 24 hrs:   BP Temp Temp src Pulse Resp SpO2   21 0102 (!) 124/90 98.4  F (36.9  C) Oral 96 16 --   07/15/21 2003 (!) 156/87 -- -- 103 -- --   07/15/21 1558 138/84 98.3  F (36.8  C) Oral 94 16 97 %   07/15/21 1308 -- -- -- 86 18 --   07/15/21 0954 133/76 98.6  F (37  C) Oral 106 18 97 %       The amount and color of the lochia is appropriate for the duration of recovery.  The uterine fundus is firm.  Urinary output is adequate.  The incision is healing well.  The patient is ambulating well.  The patient is tolerating a regular diet.      Recent Results (from the past 24 hour(s))   CBC with platelets and differential    Collection Time: 21  6:19 AM   Result Value Ref Range    WBC Count 11.6 (H) 4.0 - 11.0 10e3/uL    RBC Count 3.16 (L) 3.80 - 5.20 10e6/uL    Hemoglobin 9.6 (L) 11.7 - 15.7 g/dL    Hematocrit 29.1 (L) 35.0 - 47.0 %    MCV 92 78 - 100 fL    MCH 30.4 26.5 - 33.0 pg    MCHC 33.0 31.5 - 36.5 g/dL    RDW 12.8 10.0 - 15.0 %    Platelet Count 323 150 - 450 10e3/uL    % Neutrophils 71 %    % Lymphocytes 24 %    % Monocytes 3 %    % Eosinophils 1 %    % Basophils 0 %    %  Immature Granulocytes 1 %    NRBCs per 100 WBC 0 <1 /100    Absolute Neutrophils 8.3 1.6 - 8.3 10e3/uL    Absolute Lymphocytes 2.8 0.8 - 5.3 10e3/uL    Absolute Monocytes 0.4 0.0 - 1.3 10e3/uL    Absolute Eosinophils 0.1 0.0 - 0.7 10e3/uL    Absolute Basophils 0.0 0.0 - 0.2 10e3/uL    Absolute Immature Granulocytes 0.1 (H) <=0.0 10e3/uL    Absolute NRBCs 0.0 10e3/uL         Immunization History   Administered Date(s) Administered     TDAP Vaccine (Adacel) 05/21/2021         Provider: EVELIN Lemon CNM    Date:  7/16/2021  Time:  9:12 AM   Yes

## 2023-01-31 NOTE — ED PROVIDER NOTE - CLINICAL SUMMARY MEDICAL DECISION MAKING FREE TEXT BOX
Recurrent biliary colic.  Workup did not reveal cholecystis or CBD stone.  Will improve symptoms and refer for outpatient surgical management.

## 2023-01-31 NOTE — ED PROVIDER NOTE - PROGRESS NOTE DETAILS
Amber: pt tolerating PO, states pain improved, has f/u and is scheduled for surgery with Dr. Saldivar.  Strict return precautions given, will DC.

## 2023-01-31 NOTE — ED PROVIDER NOTE - CARE PROVIDERS DIRECT ADDRESSES
,DirectAddress_Unknown,[email or web address]

## 2023-01-31 NOTE — ED PROVIDER NOTE - OBJECTIVE STATEMENT
33 yo female w/ PMH of Factor V Leiden, DVT, gallstones presents for RUQ/epigastric pain x 3 hours. No trauma, no alleviating/provoking factors, similar to gallbladder pain in the past but worse.  Associated N/V and diarrhea. No fevers, urinary sxs, abnormal vaginal discharge.

## 2023-02-07 ENCOUNTER — LABORATORY RESULT (OUTPATIENT)
Age: 33
End: 2023-02-07

## 2023-02-07 ENCOUNTER — APPOINTMENT (OUTPATIENT)
Dept: HEMATOLOGY ONCOLOGY | Facility: CLINIC | Age: 33
End: 2023-02-07

## 2023-02-07 ENCOUNTER — OUTPATIENT (OUTPATIENT)
Dept: OUTPATIENT SERVICES | Facility: HOSPITAL | Age: 33
LOS: 1 days | End: 2023-02-07
Payer: MEDICAID

## 2023-02-07 ENCOUNTER — APPOINTMENT (OUTPATIENT)
Dept: HEMATOLOGY ONCOLOGY | Facility: CLINIC | Age: 33
End: 2023-02-07
Payer: MEDICAID

## 2023-02-07 VITALS
RESPIRATION RATE: 14 BRPM | HEART RATE: 82 BPM | DIASTOLIC BLOOD PRESSURE: 62 MMHG | SYSTOLIC BLOOD PRESSURE: 119 MMHG | WEIGHT: 140 LBS | BODY MASS INDEX: 23.32 KG/M2 | HEIGHT: 65 IN | TEMPERATURE: 98.8 F | OXYGEN SATURATION: 98 %

## 2023-02-07 DIAGNOSIS — I82.409 ACUTE EMBOLISM AND THROMBOSIS OF UNSPECIFIED DEEP VEINS OF UNSPECIFIED LOWER EXTREMITY: ICD-10-CM

## 2023-02-07 LAB
ALBUMIN SERPL ELPH-MCNC: 4.4 G/DL
ALP BLD-CCNC: 71 U/L
ALT SERPL-CCNC: 15 U/L
ANION GAP SERPL CALC-SCNC: 11 MMOL/L
AST SERPL-CCNC: 16 U/L
BILIRUB SERPL-MCNC: 0.4 MG/DL
BUN SERPL-MCNC: 10 MG/DL
CALCIUM SERPL-MCNC: 9.6 MG/DL
CHLORIDE SERPL-SCNC: 102 MMOL/L
CO2 SERPL-SCNC: 26 MMOL/L
CREAT SERPL-MCNC: 0.7 MG/DL
EGFR: 117 ML/MIN/1.73M2
GLUCOSE SERPL-MCNC: 93 MG/DL
HCT VFR BLD CALC: 39 %
HGB BLD-MCNC: 12.7 G/DL
MCHC RBC-ENTMCNC: 30.1 PG
MCHC RBC-ENTMCNC: 32.6 G/DL
MCV RBC AUTO: 92.4 FL
PLATELET # BLD AUTO: 172 K/UL
PMV BLD: 11.6 FL
POTASSIUM SERPL-SCNC: 4.6 MMOL/L
PROT SERPL-MCNC: 7 G/DL
RBC # BLD: 4.22 M/UL
RBC # FLD: 13.2 %
SODIUM SERPL-SCNC: 139 MMOL/L
WBC # FLD AUTO: 6.01 K/UL

## 2023-02-07 PROCEDURE — 86147 CARDIOLIPIN ANTIBODY EA IG: CPT

## 2023-02-07 PROCEDURE — 85730 THROMBOPLASTIN TIME PARTIAL: CPT

## 2023-02-07 PROCEDURE — 85306 CLOT INHIBIT PROT S FREE: CPT

## 2023-02-07 PROCEDURE — 86146 BETA-2 GLYCOPROTEIN ANTIBODY: CPT

## 2023-02-07 PROCEDURE — 85303 CLOT INHIBIT PROT C ACTIVITY: CPT

## 2023-02-07 PROCEDURE — 99214 OFFICE O/P EST MOD 30 MIN: CPT

## 2023-02-07 PROCEDURE — 85613 RUSSELL VIPER VENOM DILUTED: CPT

## 2023-02-07 PROCEDURE — 80053 COMPREHEN METABOLIC PANEL: CPT

## 2023-02-07 PROCEDURE — 85027 COMPLETE CBC AUTOMATED: CPT

## 2023-02-07 PROCEDURE — 85300 ANTITHROMBIN III ACTIVITY: CPT

## 2023-02-08 DIAGNOSIS — I82.409 ACUTE EMBOLISM AND THROMBOSIS OF UNSPECIFIED DEEP VEINS OF UNSPECIFIED LOWER EXTREMITY: ICD-10-CM

## 2023-02-08 LAB
AT III PPP CHRO-ACNC: 105 %
PROT S AG ACT/NOR PPP IA: 76 %

## 2023-02-08 NOTE — HISTORY OF PRESENT ILLNESS
[de-identified] : 2/7/2023 Patient returns for pregnancy related DVt and factor 5 Leiden . She had mild pre-eclampsia and  continued on lovenox for 6 weeks post partum . She has mild residual LLE swelling . She is now scheduled for elective gallbladder surgery .

## 2023-02-08 NOTE — ASSESSMENT
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 Outpatient testing showed Factor V Leiden and MTHFR mutation .\par \par Gallbladder disease for surgery . \par Plan : will complete thrombophilia work up .\par           recommend DVT prophylaxis post operatively .

## 2023-02-09 ENCOUNTER — OUTPATIENT (OUTPATIENT)
Dept: OUTPATIENT SERVICES | Facility: HOSPITAL | Age: 33
LOS: 1 days | End: 2023-02-09
Payer: MEDICAID

## 2023-02-09 VITALS
OXYGEN SATURATION: 100 % | TEMPERATURE: 98 F | WEIGHT: 140.43 LBS | RESPIRATION RATE: 18 BRPM | DIASTOLIC BLOOD PRESSURE: 66 MMHG | SYSTOLIC BLOOD PRESSURE: 116 MMHG | HEIGHT: 65 IN | HEART RATE: 82 BPM

## 2023-02-09 DIAGNOSIS — Z78.9 OTHER SPECIFIED HEALTH STATUS: Chronic | ICD-10-CM

## 2023-02-09 DIAGNOSIS — Z01.818 ENCOUNTER FOR OTHER PREPROCEDURAL EXAMINATION: ICD-10-CM

## 2023-02-09 DIAGNOSIS — K80.10 CALCULUS OF GALLBLADDER WITH CHRONIC CHOLECYSTITIS WITHOUT OBSTRUCTION: ICD-10-CM

## 2023-02-09 LAB
ALBUMIN SERPL ELPH-MCNC: 4.8 G/DL — SIGNIFICANT CHANGE UP (ref 3.5–5.2)
ALP SERPL-CCNC: 67 U/L — SIGNIFICANT CHANGE UP (ref 30–115)
ALT FLD-CCNC: 12 U/L — SIGNIFICANT CHANGE UP (ref 0–41)
ANION GAP SERPL CALC-SCNC: 10 MMOL/L — SIGNIFICANT CHANGE UP (ref 7–14)
APTT BLD: 32.3 SEC — SIGNIFICANT CHANGE UP (ref 27–39.2)
AST SERPL-CCNC: 15 U/L — SIGNIFICANT CHANGE UP (ref 0–41)
B2 GLYCOPROT1 IGA SERPL IA-ACNC: <5 SAU
B2 GLYCOPROT1 IGG SER-ACNC: <5 SGU
B2 GLYCOPROT1 IGM SER-ACNC: <5 SMU
BASOPHILS # BLD AUTO: 0.04 K/UL — SIGNIFICANT CHANGE UP (ref 0–0.2)
BASOPHILS NFR BLD AUTO: 0.6 % — SIGNIFICANT CHANGE UP (ref 0–1)
BILIRUB SERPL-MCNC: 0.3 MG/DL — SIGNIFICANT CHANGE UP (ref 0.2–1.2)
BUN SERPL-MCNC: 12 MG/DL — SIGNIFICANT CHANGE UP (ref 10–20)
CALCIUM SERPL-MCNC: 9.6 MG/DL — SIGNIFICANT CHANGE UP (ref 8.4–10.5)
CARDIOLIPIN AB SER IA-ACNC: NEGATIVE
CHLORIDE SERPL-SCNC: 100 MMOL/L — SIGNIFICANT CHANGE UP (ref 98–110)
CO2 SERPL-SCNC: 28 MMOL/L — SIGNIFICANT CHANGE UP (ref 17–32)
CREAT SERPL-MCNC: 0.6 MG/DL — LOW (ref 0.7–1.5)
EGFR: 121 ML/MIN/1.73M2 — SIGNIFICANT CHANGE UP
EOSINOPHIL # BLD AUTO: 0.04 K/UL — SIGNIFICANT CHANGE UP (ref 0–0.7)
EOSINOPHIL NFR BLD AUTO: 0.6 % — SIGNIFICANT CHANGE UP (ref 0–8)
GLUCOSE SERPL-MCNC: 69 MG/DL — LOW (ref 70–99)
HCT VFR BLD CALC: 39.6 % — SIGNIFICANT CHANGE UP (ref 37–47)
HGB BLD-MCNC: 13.2 G/DL — SIGNIFICANT CHANGE UP (ref 12–16)
IMM GRANULOCYTES NFR BLD AUTO: 0.3 % — SIGNIFICANT CHANGE UP (ref 0.1–0.3)
INR BLD: 0.94 RATIO — SIGNIFICANT CHANGE UP (ref 0.65–1.3)
LYMPHOCYTES # BLD AUTO: 2.32 K/UL — SIGNIFICANT CHANGE UP (ref 1.2–3.4)
LYMPHOCYTES # BLD AUTO: 35.2 % — SIGNIFICANT CHANGE UP (ref 20.5–51.1)
MCHC RBC-ENTMCNC: 30.8 PG — SIGNIFICANT CHANGE UP (ref 27–31)
MCHC RBC-ENTMCNC: 33.3 G/DL — SIGNIFICANT CHANGE UP (ref 32–37)
MCV RBC AUTO: 92.3 FL — SIGNIFICANT CHANGE UP (ref 81–99)
MONOCYTES # BLD AUTO: 0.45 K/UL — SIGNIFICANT CHANGE UP (ref 0.1–0.6)
MONOCYTES NFR BLD AUTO: 6.8 % — SIGNIFICANT CHANGE UP (ref 1.7–9.3)
NEUTROPHILS # BLD AUTO: 3.72 K/UL — SIGNIFICANT CHANGE UP (ref 1.4–6.5)
NEUTROPHILS NFR BLD AUTO: 56.5 % — SIGNIFICANT CHANGE UP (ref 42.2–75.2)
NRBC # BLD: 0 /100 WBCS — SIGNIFICANT CHANGE UP (ref 0–0)
PLATELET # BLD AUTO: 183 K/UL — SIGNIFICANT CHANGE UP (ref 130–400)
POTASSIUM SERPL-MCNC: 4.2 MMOL/L — SIGNIFICANT CHANGE UP (ref 3.5–5)
POTASSIUM SERPL-SCNC: 4.2 MMOL/L — SIGNIFICANT CHANGE UP (ref 3.5–5)
PROT SERPL-MCNC: 7.3 G/DL — SIGNIFICANT CHANGE UP (ref 6–8)
PROTHROM AB SERPL-ACNC: 10.7 SEC — SIGNIFICANT CHANGE UP (ref 9.95–12.87)
RBC # BLD: 4.29 M/UL — SIGNIFICANT CHANGE UP (ref 4.2–5.4)
RBC # FLD: 13.4 % — SIGNIFICANT CHANGE UP (ref 11.5–14.5)
SODIUM SERPL-SCNC: 138 MMOL/L — SIGNIFICANT CHANGE UP (ref 135–146)
WBC # BLD: 6.59 K/UL — SIGNIFICANT CHANGE UP (ref 4.8–10.8)
WBC # FLD AUTO: 6.59 K/UL — SIGNIFICANT CHANGE UP (ref 4.8–10.8)

## 2023-02-09 PROCEDURE — 36415 COLL VENOUS BLD VENIPUNCTURE: CPT

## 2023-02-09 PROCEDURE — 85730 THROMBOPLASTIN TIME PARTIAL: CPT

## 2023-02-09 PROCEDURE — 99214 OFFICE O/P EST MOD 30 MIN: CPT | Mod: 25

## 2023-02-09 PROCEDURE — 85025 COMPLETE CBC W/AUTO DIFF WBC: CPT

## 2023-02-09 PROCEDURE — 85610 PROTHROMBIN TIME: CPT

## 2023-02-09 PROCEDURE — 80053 COMPREHEN METABOLIC PANEL: CPT

## 2023-02-09 NOTE — H&P PST ADULT - HAVE YOU RECEIVED AT LEAST TWO PFIZER AND/OR MODERNA VACCINATIONS (IN ANY COMBINATION) AND/OR ONE JOHNSON & JOHNSON VACCINATION?
Please notify patient--  Blood sugar is normal.    Kidney function ok.  Cholesterol is somewhat high.  This does not need medication, but should be treated with healthy lifestyle.  Several things can help your cholesterol.  Suggestions include healthy eating and physical activity.  A good physical activity goal for heart health is 30 minutes of moderate activity (such as walking at a speed which gets your heart rate up or your breathing up) 5 days per week. For healthy eating, consider the Mediterranean diet.  More information of this diet can be found at [email or web address].  Let's plan to recheck your cholesterol in 1 year. Yes

## 2023-02-09 NOTE — H&P PST ADULT - HISTORY OF PRESENT ILLNESS
Patient is a 32 year old female presenting to PAST in preparation for ROBOTIC CHOLECYSTECTOMY AND ALL OTHER INDICATED PROCEDURE on 2/23 under gen anesthesia by Dr. Palomo  reports h/o gallstones, recently delivered in Oct 2022 she is not nursing baby. Reports also developed dvt and was on lovenox she completed the med 7 weeks post-partum. has been advised to have above  PATIENT CURRENTLY DENIES CHEST PAIN  SHORTNESS OF BREATH  PALPITATIONS,  DYSURIA, OR UPPER RESPIRATORY INFECTION IN PAST 2 WEEKS  EXERCISE  TOLERANCE  1-2 FLIGHT OF STAIRS  WITHOUT SHORTNESS OF BREATH    Anesthesia Alert  NO--Difficult Airway  NO--History of neck surgery or radiation  NO--Limited ROM of neck  NO--History of Malignant hyperthermia  NO--Personal or family history of Pseudocholinesterase deficiency  NO--Prior Anesthesia Complication  NO--Latex Allergy  NO--Loose teeth  NO--History of Rheumatoid Arthritis  NO--ANTHONY  NO-- BLEEDING RISK  NO--Other_____    As per patient, this is their complete medical and surgical history, including medications both prescribed or over the counter.  Patient verbalized understanding of instructions and was given the opportunity to ask questions and have them answered.  Patient denies any signs or symptoms of COVID 19 and denies contact with known positive individuals.  They have an appointment for  COVID testing pre-procedure and acknowledge its time and place.  They were instructed to quarantine pre-procedure, practice exposure control measures, continue to self-monitor and report any concerns to their proceduralist.   Patient is a 32 year old female presenting to PAST in preparation for ROBOTIC CHOLECYSTECTOMY AND ALL OTHER INDICATED PROCEDURE on 2/23 under gen anesthesia by Dr. Palomo  reports h/o gallstones,( recently delivered in Oct 2022 she is not nursing baby.) Reports also developed dvt and was on lovenox she completed the med 7 weeks post-partum. has been advised to have above  PATIENT CURRENTLY DENIES CHEST PAIN  SHORTNESS OF BREATH  PALPITATIONS,  DYSURIA, OR UPPER RESPIRATORY INFECTION IN PAST 2 WEEKS  EXERCISE  TOLERANCE  1-2 FLIGHT OF STAIRS  WITHOUT SHORTNESS OF BREATH    Anesthesia Alert  NO--Difficult Airway  NO--History of neck surgery or radiation  NO--Limited ROM of neck  NO--History of Malignant hyperthermia  NO--Personal or family history of Pseudocholinesterase deficiency  NO--Prior Anesthesia Complication  NO--Latex Allergy  NO--Loose teeth  NO--History of Rheumatoid Arthritis  NO--ANTHONY  NO-- BLEEDING RISK  NO--Other_____    As per patient, this is their complete medical and surgical history, including medications both prescribed or over the counter.  Patient verbalized understanding of instructions and was given the opportunity to ask questions and have them answered.  Patient denies any signs or symptoms of COVID 19 and denies contact with known positive individuals.  They have an appointment for  COVID testing pre-procedure and acknowledge its time and place.  They were instructed to quarantine pre-procedure, practice exposure control measures, continue to self-monitor and report any concerns to their proceduralist.

## 2023-02-09 NOTE — H&P PST ADULT - NSICDXPASTMEDICALHX_GEN_ALL_CORE_FT
PAST MEDICAL HISTORY:  DVT (deep venous thrombosis)     Gallstones     H/O factor V Leiden mutation     Spontaneous vaginal delivery at wk 33

## 2023-02-09 NOTE — H&P PST ADULT - TOBACCO, LAST USE DATE, PROFILE
----- Message from Ronni Tam MD sent at 5/16/2017  6:58 AM CDT -----  Mild microhematuria, cancel Renal US.  Schedule Renal Protocol CT no contrast since CKD 2.  Also consult urology  
Left message for patient  
Spoke to patient and relayed results and recommendations  Placed orders  
09-Feb-2013

## 2023-02-09 NOTE — H&P PST ADULT - NSICDXFAMILYHX_GEN_ALL_CORE_FT
FAMILY HISTORY:  Mother  Still living? Yes, Estimated age: Age Unknown  FH: cancer, Age at diagnosis: Age Unknown

## 2023-02-10 DIAGNOSIS — K80.10 CALCULUS OF GALLBLADDER WITH CHRONIC CHOLECYSTITIS WITHOUT OBSTRUCTION: ICD-10-CM

## 2023-02-10 DIAGNOSIS — Z01.818 ENCOUNTER FOR OTHER PREPROCEDURAL EXAMINATION: ICD-10-CM

## 2023-02-10 LAB — PROT C PPP CHRO-ACNC: 109 %

## 2023-02-20 ENCOUNTER — LABORATORY RESULT (OUTPATIENT)
Age: 33
End: 2023-02-20

## 2023-02-22 RX ORDER — INDOCYANINE GREEN 25 MG
25 KIT INTRAVASCULAR; INTRAVENOUS ONCE
Refills: 0 | Status: DISCONTINUED | OUTPATIENT
Start: 2023-02-23 | End: 2023-02-23

## 2023-02-22 NOTE — ASU PATIENT PROFILE, ADULT - AS SC BRADEN MOBILITY
[>50% of Time Spent on Counseling for ____] : Greater than 50% of the encounter time was spent on counseling for [unfilled] [Time Spent: ___ minutes] : I have spent [unfilled] minutes of face to face time with the patient (4) no limitation

## 2023-02-22 NOTE — ASU PATIENT PROFILE, ADULT - AS SC BRADEN NUTRITION
The documentation recorded by the scribe accurately and completely reflects the service(s) I personally performed and the decisions made by me.        (4) excellent

## 2023-02-23 ENCOUNTER — OUTPATIENT (OUTPATIENT)
Dept: INPATIENT UNIT | Facility: HOSPITAL | Age: 33
LOS: 1 days | Discharge: HOME | End: 2023-02-23
Payer: MEDICAID

## 2023-02-23 ENCOUNTER — TRANSCRIPTION ENCOUNTER (OUTPATIENT)
Age: 33
End: 2023-02-23

## 2023-02-23 ENCOUNTER — APPOINTMENT (OUTPATIENT)
Dept: SURGERY | Facility: HOSPITAL | Age: 33
End: 2023-02-23

## 2023-02-23 ENCOUNTER — RESULT REVIEW (OUTPATIENT)
Age: 33
End: 2023-02-23

## 2023-02-23 VITALS
HEIGHT: 65 IN | RESPIRATION RATE: 17 BRPM | OXYGEN SATURATION: 97 % | HEART RATE: 93 BPM | DIASTOLIC BLOOD PRESSURE: 68 MMHG | SYSTOLIC BLOOD PRESSURE: 106 MMHG | WEIGHT: 139.99 LBS | TEMPERATURE: 99 F

## 2023-02-23 VITALS
DIASTOLIC BLOOD PRESSURE: 53 MMHG | RESPIRATION RATE: 18 BRPM | OXYGEN SATURATION: 98 % | HEART RATE: 95 BPM | SYSTOLIC BLOOD PRESSURE: 96 MMHG

## 2023-02-23 DIAGNOSIS — Z78.9 OTHER SPECIFIED HEALTH STATUS: Chronic | ICD-10-CM

## 2023-02-23 DIAGNOSIS — K80.10 CALCULUS OF GALLBLADDER WITH CHRONIC CHOLECYSTITIS WITHOUT OBSTRUCTION: ICD-10-CM

## 2023-02-23 PROCEDURE — 88304 TISSUE EXAM BY PATHOLOGIST: CPT

## 2023-02-23 PROCEDURE — 47563 LAPARO CHOLECYSTECTOMY/GRAPH: CPT

## 2023-02-23 PROCEDURE — S2900 ROBOTIC SURGICAL SYSTEM: CPT | Mod: NC

## 2023-02-23 PROCEDURE — S2900: CPT

## 2023-02-23 PROCEDURE — 88304 TISSUE EXAM BY PATHOLOGIST: CPT | Mod: 26

## 2023-02-23 PROCEDURE — C9399: CPT

## 2023-02-23 RX ORDER — HYDROMORPHONE HYDROCHLORIDE 2 MG/ML
0.5 INJECTION INTRAMUSCULAR; INTRAVENOUS; SUBCUTANEOUS
Refills: 0 | Status: DISCONTINUED | OUTPATIENT
Start: 2023-02-23 | End: 2023-02-23

## 2023-02-23 RX ORDER — ONDANSETRON 8 MG/1
4 TABLET, FILM COATED ORAL ONCE
Refills: 0 | Status: DISCONTINUED | OUTPATIENT
Start: 2023-02-23 | End: 2023-02-23

## 2023-02-23 RX ORDER — APIXABAN 2.5 MG/1
1 TABLET, FILM COATED ORAL
Qty: 28 | Refills: 0
Start: 2023-02-23

## 2023-02-23 RX ORDER — ENOXAPARIN SODIUM 100 MG/ML
40 INJECTION SUBCUTANEOUS ONCE
Refills: 0 | Status: COMPLETED | OUTPATIENT
Start: 2023-02-23 | End: 2023-02-23

## 2023-02-23 RX ORDER — HYDROMORPHONE HYDROCHLORIDE 2 MG/ML
1 INJECTION INTRAMUSCULAR; INTRAVENOUS; SUBCUTANEOUS
Refills: 0 | Status: DISCONTINUED | OUTPATIENT
Start: 2023-02-23 | End: 2023-02-23

## 2023-02-23 RX ADMIN — ENOXAPARIN SODIUM 40 MILLIGRAM(S): 100 INJECTION SUBCUTANEOUS at 13:19

## 2023-02-23 NOTE — ASU DISCHARGE PLAN (ADULT/PEDIATRIC) - NS MD DC FALL RISK RISK
For information on Fall & Injury Prevention, visit: [email or web address] OR  [email or web address] OR  https://www.cdc.gov/steadi/patient.html

## 2023-02-23 NOTE — ASU DISCHARGE PLAN (ADULT/PEDIATRIC) - CARE PROVIDER_API CALL
Clayton Saldivar (JOAQUINA)  Surgery  [address] [place] [address], [address] [place] [address]  Phone: [phone]  Fax: [phone]  Established Patient  Follow Up Time: 1 week

## 2023-02-23 NOTE — PRE-ANESTHESIA EVALUATION ADULT - NSANTHPMHFT_GEN_ALL_CORE
F5 leiden mutation/MTHFR mutation, blood clot while pregnant and mostly immobile due to hyperemesis gravidarum    heme: rec periop dvt ppx F5 leiden mutation/MTHFR mutation, blood clot while pregnant and mostly immobile due to hyperemesis gravidarum    heme: rec periop dvt ppx    received lovenox in preop

## 2023-02-23 NOTE — BRIEF OPERATIVE NOTE - NSICDXBRIEFPROCEDURE_GEN_ALL_CORE_FT
PROCEDURES:  Robot-assisted cholecystectomy 23-Feb-2023 16:07:45  Andrea Abdi  Block, transversus abdominis plane, bilateral 23-Feb-2023 16:07:54  Andrea Abdi

## 2023-02-23 NOTE — BRIEF OPERATIVE NOTE - OPERATION/FINDINGS
dilated gallbladder, critical view of safety obtained, cystic duct/CBD visualized via ICG s/p cholecystectomy

## 2023-03-01 LAB — SURGICAL PATHOLOGY STUDY: SIGNIFICANT CHANGE UP

## 2023-03-02 DIAGNOSIS — K80.10 CALCULUS OF GALLBLADDER WITH CHRONIC CHOLECYSTITIS WITHOUT OBSTRUCTION: ICD-10-CM

## 2023-03-02 DIAGNOSIS — Z86.718 PERSONAL HISTORY OF OTHER VENOUS THROMBOSIS AND EMBOLISM: ICD-10-CM

## 2023-03-02 DIAGNOSIS — Z87.891 PERSONAL HISTORY OF NICOTINE DEPENDENCE: ICD-10-CM

## 2023-03-02 DIAGNOSIS — K81.1 CHRONIC CHOLECYSTITIS: ICD-10-CM

## 2023-03-02 DIAGNOSIS — D68.51 ACTIVATED PROTEIN C RESISTANCE: ICD-10-CM

## 2023-03-08 ENCOUNTER — APPOINTMENT (OUTPATIENT)
Dept: SURGERY | Facility: CLINIC | Age: 33
End: 2023-03-08
Payer: MEDICAID

## 2023-03-08 DIAGNOSIS — K80.10 CALCULUS OF GALLBLADDER WITH CHRONIC CHOLECYSTITIS W/OUT OBSTRUCTION: ICD-10-CM

## 2023-03-08 PROCEDURE — 99024 POSTOP FOLLOW-UP VISIT: CPT

## 2023-03-08 NOTE — ASSESSMENT
[FreeTextEntry1] : Ms. JEREMIAS HILARIO is a 32 year  year old woman. She presented to the office for the first time on 01/25/2023 , her primary is CHRIS RUELAS .\par \par She presented to the office with h/o epigastric pain and right upper quadrant pain after eating potato salad.\par Her us gallstones , cbd is normal .\par lfts are normal \par She has facgtor 5 leidean is 12 weeks post partum . When she was 10 week pregnant was started on lovenox and stopped it 1 month ago . \par \par spoke with vascular \par will see Odaimi next week. \par 3/85/23 : \par she is doing well\par she is on 14 days blood thiners\par \par impression : biliary colic. \par helaing scars\par \par \par We explained in great detail the pathophysiology of the disease process. We discussed the workup for diagnosis and management. The Post operative care was explained to the patient. He was counselled on diet , exercise and wound care. The Procedure was explained to the patient. The Risk , benefit and alternatives were discussed. We discussed recovery and possible complications. We discussed complications including sever complications like injury of the surrounding organs like the bile duct. We discussed about the benefits and disadvantage of converting the laparoscopic surgery to open. We also discussed about post cholecystectomy syndrome and  symptom management after surgery.\par \par We discussed the importance of close follow up. \par We informed that she needs to follow up as needed. \par We also informed that she can call us if anything changes or has any questions.\par \par .

## 2023-03-08 NOTE — PHYSICAL EXAM
[Alert] : alert [Calm] : calm [JVD] : no jugular venous distention  [Purpura] : no purpura  [de-identified] : Normal  [de-identified] : Normal  [de-identified] : Normal \par healing scars

## 2023-03-16 PROBLEM — Z86.2 PERSONAL HISTORY OF DISEASES OF THE BLOOD AND BLOOD-FORMING ORGANS AND CERTAIN DISORDERS INVOLVING THE IMMUNE MECHANISM: Chronic | Status: ACTIVE | Noted: 2023-02-09

## 2023-03-16 PROBLEM — K80.20 CALCULUS OF GALLBLADDER WITHOUT CHOLECYSTITIS WITHOUT OBSTRUCTION: Chronic | Status: ACTIVE | Noted: 2023-02-09

## 2023-05-07 NOTE — ED PROVIDER NOTE - NS ED ROS FT
Review of Systems    Constitutional: (-) fever, (-) chills  Eyes/ENT: (-) blurry vision, (-) epistaxis, (-) sore throat  Cardiovascular: (+) chest pain, (-) syncope  Respiratory: (-) cough, (+) shortness of breath  Gastrointestinal: (+) pain, (-) nausea, (-) vomiting, (-) diarrhea  Musculoskeletal: (-) neck pain, (-) back pain, (-) body aches  Integumentary: (-) rash, (-) edema  Neurological: (-) headache, (-) altered mental status  Psychiatric: (-) hallucinations  Allergic/Immunologic: (-) pruritus High Risk (score 12 or above)

## 2023-07-06 NOTE — ED ADULT NURSE NOTE - CHPI ED NUR SYMPTOMS POS
Take antibiotic as directed. Recommend saltwater rinses every 2 hours, soft toothbrush, and soft food. If you develop any increased pain, facial swelling, or unable to open or close your mouth, prolonged high fever, any new or concerning symptoms please return proceed ER. Advised follow-up with dentist as soon as possible. PAIN

## 2023-07-08 NOTE — ED PROVIDER NOTE - NSFOLLOWUPINSTRUCTIONS_ED_ALL_ED_FT
Hyperemesis Gravidarum  Hyperemesis gravidarum is a severe form of nausea and vomiting that happens during pregnancy. Hyperemesis is worse than morning sickness. It may cause you to have nausea or vomiting all day for many days. It may keep you from eating and drinking enough food and liquids, which can lead to dehydration, malnutrition, and weight loss. Hyperemesis usually occurs during the first half (the first 20 weeks) of pregnancy. It often goes away once a woman is in her second half of pregnancy. However, sometimes hyperemesis continues through an entire pregnancy.    What are the causes?  The cause of this condition is not known. It may be related to changes in chemicals (hormones) in the body during pregnancy, such as the high level of pregnancy hormone (human chorionic gonadotropin) or the increase in the female sex hormone (estrogen).    What are the signs or symptoms?  Symptoms of this condition include:  Nausea that does not go away.  Vomiting that does not allow you to keep any food down.  Weight loss.  Body fluid loss (dehydration).  Having no desire to eat, or not liking food that you have previously enjoyed.  How is this diagnosed?  This condition may be diagnosed based on:  A physical exam.  Your medical history.  Your symptoms.  Blood tests.  Urine tests.  How is this treated?  This condition is managed by controlling symptoms. This may include:  Following an eating plan. This can help lessen nausea and vomiting.  Taking prescription medicines.  An eating plan and medicines are often used together to help control symptoms. If medicines do not help relieve nausea and vomiting, you may need to receive fluids through an IV at the hospital.    Follow these instructions at home:  Eating and drinking     Avoid the following:  Drinking fluids with meals. Try not to drink anything during the 30 minutes before and after your meals.  Drinking more than 1 cup of fluid at a time.  Eating foods that trigger your symptoms. These may include spicy foods, coffee, high-fat foods, very sweet foods, and acidic foods.  Skipping meals. Nausea can be more intense on an empty stomach. If you cannot tolerate food, do not force it. Try sucking on ice chips or other frozen items and make up for missed calories later.  Lying down within 2 hours after eating.  Being exposed to environmental triggers. These may include food smells, smoky rooms, closed spaces, rooms with strong smells, warm or humid places, overly loud and noisy rooms, and rooms with motion or flickering lights. Try eating meals in a well-ventilated area that is free of strong smells.  Quick and sudden changes in your movement.  Taking iron pills and multivitamins that contain iron. If you take prescription iron pills, do not stop taking them unless your health care provider approves.  Preparing food. The smell of food can spoil your appetite or trigger nausea.  To help relieve your symptoms:  Listen to your body. Everyone is different and has different preferences. Find what works best for you.  Eat and drink slowly.  Eat 5–6 small meals daily instead of 3 large meals. Eating small meals and snacks can help you avoid an empty stomach.  In the morning, before getting out of bed, eat a couple of crackers to avoid moving around on an empty stomach.  Try eating starchy foods as these are usually tolerated well. Examples include cereal, toast, bread, potatoes, pasta, rice, and pretzels.  Include at least 1 serving of protein with your meals and snacks. Protein options include lean meats, poultry, seafood, beans, nuts, nut butters, eggs, cheese, and yogurt.  Try eating a protein-rich snack before bed. Examples of a protein-kala snack include cheese and crackers or a peanut butter sandwich made with 1 slice of whole-wheat bread and 1 tsp (5 g) of peanut butter.  Eat or suck on things that have ginger in them. It may help relieve nausea. Add ¼ tsp ground ginger to hot tea or choose ginger tea.  Try drinking 100% fruit juice or an electrolyte drink. An electrolyte drink contains sodium, potassium, and chloride.  Drink fluids that are cold, clear, and carbonated or sour. Examples include lemonade, ginger ale, lemon–lime soda, ice water, and sparkling water.  Brush your teeth or use a mouth rinse after meals.  Talk with your health care provider about starting a supplement of vitamin B6.  General instructions     Take over-the-counter and prescription medicines only as told by your health care provider.  Follow instructions from your health care provider about eating or drinking restrictions.  Continue to take your prenatal vitamins as told by your health care provider. If you are having trouble taking your prenatal vitamins, talk with your health care provider about different options.  Keep all follow-up and pre-birth (prenatal) visits as told by your health care provider. This is important.  Contact a health care provider if:  You have pain in your abdomen.  You have a severe headache.  You have vision problems.  You are losing weight.  You feel weak or dizzy.  Get help right away if:  You cannot drink fluids without vomiting.  You vomit blood.  You have constant nausea and vomiting.  You are very weak.  You faint.  You have a fever and your symptoms suddenly get worse.  Summary  Hyperemesis gravidarum is a severe form of nausea and vomiting that happens during pregnancy.  Making some changes to your eating habits may help relieve nausea and vomiting.  This condition may be managed with medicine.  If medicines do not help relieve nausea and vomiting, you may need to receive fluids through an IV at the hospital.  This information is not intended to replace advice given to you by your health care provider. Make sure you discuss any questions you have with your health care provider.        Back Pain    Back pain is very common in adults. The cause of back pain is rarely dangerous and the pain often gets better over time. The cause of your back pain may not be known and may include strain of muscles or ligaments, degeneration of the spinal disks, or arthritis. Occasionally the pain may radiate down your leg(s). Over-the-counter medicines to reduce pain and inflammation are often the most helpful. Stretching and remaining active frequently helps the healing process.     SEEK IMMEDIATE MEDICAL CARE IF YOU HAVE ANY OF THE FOLLOWING SYMPTOMS: bowel or bladder control problems, unusual weakness or numbness in your arms or legs, nausea or vomiting, abdominal pain, fever, dizziness/lightheadedness. 1 Principal Discharge DX:	CHF exacerbation

## 2023-08-10 ENCOUNTER — APPOINTMENT (OUTPATIENT)
Dept: VASCULAR SURGERY | Facility: CLINIC | Age: 33
End: 2023-08-10
Payer: MEDICAID

## 2023-08-10 VITALS
WEIGHT: 145 LBS | DIASTOLIC BLOOD PRESSURE: 71 MMHG | BODY MASS INDEX: 24.16 KG/M2 | SYSTOLIC BLOOD PRESSURE: 119 MMHG | HEIGHT: 65 IN

## 2023-08-10 PROCEDURE — 99212 OFFICE O/P EST SF 10 MIN: CPT

## 2023-08-10 PROCEDURE — 93971 EXTREMITY STUDY: CPT | Mod: LT

## 2023-08-11 NOTE — ASSESSMENT
[FreeTextEntry1] : 32 y/o female with LLE DVT during pregnancy, was on Lovenox until December 2022. She complains of occasional left LE pain and swelling. She had tested positive for Factor V Leiden and MTHFR gene mutation.  Venous duplex of left leg showed that there are chronic DVT in the left CIV, chronic changes seen in the common femoral popliteal peroneal veins, chronic DVT in the external iliac and femoral vein.  I will see her back in six months' time or sooner if she develops any new leg symptoms.

## 2023-08-11 NOTE — HISTORY OF PRESENT ILLNESS
[FreeTextEntry1] : 32 y/o female who was found to have left lower extremity DVT during pregnancy, denies any prior h/o DVT, was on Lovenox that she stopped in December 2022. Denies any new leg swelling or pain, was told by Hematology that she was positive for Factor V Leiden and MTHFR gene mutation.

## 2023-08-11 NOTE — DATA REVIEWED
[FreeTextEntry1] : Venous duplex of left leg showed that there are chronic occlusive DVT in the left CIV, chronic changes seen in the common femoral popliteal peroneal veins, chronic changes in the left external iliac and femoral vein.

## 2023-10-17 NOTE — ED ADULT NURSE NOTE - CINV DISCH MEDS REVIEWED YN
HPI    DLS: 3/20/2023- rev HVF/ IOP / Gonio     No new complaints. Denies pain/ FOL/ floaters. States specs are working   well.       Hemoglobin A1C       Date                     Value               Ref Range             Status                09/14/2023               7.0 (H)             4.0 - 5.6 %           Final                   03/15/2023               6.7 (H)             4.0 - 5.6 %           Final                 12/06/2022               7.5 (H)             4.0 - 5.6 %           Final                Last edited by Eunice Zacarias on 10/17/2023  3:49 PM.            Assessment /Plan     For exam results, see Encounter Report.    OAG (open angle glaucoma) suspect, low risk, bilateral    Nuclear sclerotic cataract, bilateral      1. OAG (open angle glaucoma) suspect, low risk, bilateral  +famhx  Mildly thin pachy    ONH mildly suspicous OS>OD  OCT NFL normal  HVF normal  IOP low normal    Very Low risk, follow off meds    F/u 1 year, routine exam with OCT NFL    2. Nuclear sclerotic cataract, bilateral  Mild/mod  observe                     
Yes

## 2023-11-20 NOTE — ED ADULT NURSE NOTE - NSFALLRSKUNASSIST_ED_ALL_ED
no Render Risk Assessment In Note?: no Comment: Starting month #1, 30mg po per day with plan to increase to 30mg BID Detail Level: Simple

## 2023-12-27 ENCOUNTER — NON-APPOINTMENT (OUTPATIENT)
Age: 33
End: 2023-12-27

## 2024-01-24 ENCOUNTER — NON-APPOINTMENT (OUTPATIENT)
Age: 34
End: 2024-01-24

## 2024-02-08 ENCOUNTER — APPOINTMENT (OUTPATIENT)
Dept: VASCULAR SURGERY | Facility: CLINIC | Age: 34
End: 2024-02-08
Payer: MEDICAID

## 2024-02-08 VITALS
SYSTOLIC BLOOD PRESSURE: 121 MMHG | WEIGHT: 142 LBS | BODY MASS INDEX: 23.66 KG/M2 | DIASTOLIC BLOOD PRESSURE: 71 MMHG | HEIGHT: 65 IN

## 2024-02-08 DIAGNOSIS — M79.89 OTHER SPECIFIED SOFT TISSUE DISORDERS: ICD-10-CM

## 2024-02-08 DIAGNOSIS — I82.409 ACUTE EMBOLISM AND THROMBOSIS OF UNSPECIFIED DEEP VEINS OF UNSPECIFIED LOWER EXTREMITY: ICD-10-CM

## 2024-02-08 PROCEDURE — 93971 EXTREMITY STUDY: CPT

## 2024-02-08 PROCEDURE — 99214 OFFICE O/P EST MOD 30 MIN: CPT

## 2024-02-08 NOTE — DATA REVIEWED
[FreeTextEntry1] : I performed a venous duplex which was medically necessary to evaluate for DVT. It showed chronic changes with partial recanalization in the common femoral and femoral veins. Popliteal vein free of thrombosis.

## 2024-02-08 NOTE — ASSESSMENT
[FreeTextEntry1] : 34 y/o female with LLE DVT during pregnancy, was on Lovenox until December 2022. She presents for left LE pain and swelling. She had tested positive for Factor V Leiden and MTHFR gene mutation. Currently not on anticoagulation.  Venous duplex of left leg showed that there are chronic DVT in the left common femoral and femoral veins.  I offered her left LE venogram and she is in agreement to proceed on 3/13/24.  Patient understands the risks of recurrence, bleeding, infection, rethrombosis, nerve injury, wound complications, injury to the catheterized vessel and need for additional procedures.  I, Dr. Parish Corbett, personally performed the evaluation and management (E/M) services for this new patient. That E/M includes conducting the clinically appropriate initial history &/or exam, assessing all conditions, and establishing the plan of care. Today, my RONNIE, Carmella Marcelo PA-C, was here to observe my evaluation and management service for this patient & follow plan of care established by me going forward.

## 2024-02-08 NOTE — HISTORY OF PRESENT ILLNESS
[FreeTextEntry1] : 32 y/o female who was found to have left lower extremity DVT during pregnancy, denies any prior h/o DVT, was on Lovenox that she stopped in December 2022. Denies any new leg swelling or pain, was told by Hematology that she was positive for Factor V Leiden and MTHFR gene mutation. She is currently not on anticoagulation, has been having pain to left thigh and calf.

## 2024-03-13 ENCOUNTER — APPOINTMENT (OUTPATIENT)
Dept: VASCULAR SURGERY | Facility: HOSPITAL | Age: 34
End: 2024-03-13

## 2024-03-19 ENCOUNTER — EMERGENCY (EMERGENCY)
Facility: HOSPITAL | Age: 34
LOS: 0 days | Discharge: HOME | End: 2024-03-19
Attending: EMERGENCY MEDICINE
Payer: MEDICAID

## 2024-03-19 VITALS
SYSTOLIC BLOOD PRESSURE: 92 MMHG | OXYGEN SATURATION: 99 % | RESPIRATION RATE: 20 BRPM | DIASTOLIC BLOOD PRESSURE: 60 MMHG | HEART RATE: 92 BPM

## 2024-03-19 VITALS
HEIGHT: 66 IN | WEIGHT: 139.99 LBS | SYSTOLIC BLOOD PRESSURE: 135 MMHG | HEART RATE: 135 BPM | RESPIRATION RATE: 20 BRPM | DIASTOLIC BLOOD PRESSURE: 102 MMHG | TEMPERATURE: 97 F | OXYGEN SATURATION: 99 %

## 2024-03-19 DIAGNOSIS — Z20.822 CONTACT WITH AND (SUSPECTED) EXPOSURE TO COVID-19: ICD-10-CM

## 2024-03-19 DIAGNOSIS — Z86.718 PERSONAL HISTORY OF OTHER VENOUS THROMBOSIS AND EMBOLISM: ICD-10-CM

## 2024-03-19 DIAGNOSIS — B34.9 VIRAL INFECTION, UNSPECIFIED: ICD-10-CM

## 2024-03-19 DIAGNOSIS — Z78.9 OTHER SPECIFIED HEALTH STATUS: Chronic | ICD-10-CM

## 2024-03-19 DIAGNOSIS — Z79.01 LONG TERM (CURRENT) USE OF ANTICOAGULANTS: ICD-10-CM

## 2024-03-19 DIAGNOSIS — Z90.49 ACQUIRED ABSENCE OF OTHER SPECIFIED PARTS OF DIGESTIVE TRACT: ICD-10-CM

## 2024-03-19 DIAGNOSIS — R09.81 NASAL CONGESTION: ICD-10-CM

## 2024-03-19 DIAGNOSIS — R05.9 COUGH, UNSPECIFIED: ICD-10-CM

## 2024-03-19 DIAGNOSIS — J40 BRONCHITIS, NOT SPECIFIED AS ACUTE OR CHRONIC: ICD-10-CM

## 2024-03-19 DIAGNOSIS — R50.9 FEVER, UNSPECIFIED: ICD-10-CM

## 2024-03-19 LAB
FLUAV AG NPH QL: DETECTED
FLUBV AG NPH QL: SIGNIFICANT CHANGE UP
RSV RNA NPH QL NAA+NON-PROBE: SIGNIFICANT CHANGE UP
SARS-COV-2 RNA SPEC QL NAA+PROBE: SIGNIFICANT CHANGE UP

## 2024-03-19 PROCEDURE — 71045 X-RAY EXAM CHEST 1 VIEW: CPT | Mod: 26

## 2024-03-19 PROCEDURE — 0241U: CPT

## 2024-03-19 PROCEDURE — 99285 EMERGENCY DEPT VISIT HI MDM: CPT | Mod: 25

## 2024-03-19 PROCEDURE — 93010 ELECTROCARDIOGRAM REPORT: CPT

## 2024-03-19 PROCEDURE — 93005 ELECTROCARDIOGRAM TRACING: CPT

## 2024-03-19 PROCEDURE — 96372 THER/PROPH/DIAG INJ SC/IM: CPT

## 2024-03-19 PROCEDURE — 99284 EMERGENCY DEPT VISIT MOD MDM: CPT

## 2024-03-19 PROCEDURE — 71045 X-RAY EXAM CHEST 1 VIEW: CPT

## 2024-03-19 RX ORDER — KETOROLAC TROMETHAMINE 30 MG/ML
30 SYRINGE (ML) INJECTION ONCE
Refills: 0 | Status: DISCONTINUED | OUTPATIENT
Start: 2024-03-19 | End: 2024-03-19

## 2024-03-19 RX ADMIN — Medication 30 MILLIGRAM(S): at 19:00

## 2024-03-19 NOTE — ED PROVIDER NOTE - PROGRESS NOTE DETAILS
JUDAH-- JUDAH-- discussed chest xray showing no pneumonia. Pt reports feeling tired, agrees with plan for discharge home with outpt follow up

## 2024-03-19 NOTE — ED PROVIDER NOTE - CLINICAL SUMMARY MEDICAL DECISION MAKING FREE TEXT BOX
Viral illness bronchitis.  Chest x-ray and EKG reviewed.  No evidence of ischemia.  Chest x-ray without opacities.

## 2024-03-19 NOTE — ED PROVIDER NOTE - NSFOLLOWUPINSTRUCTIONS_ED_ALL_ED_FT
Our Emergency Department Referral Coordinators will be reaching out to you in the next 24-48 hours from 9:00am to 5:00pm (Monday to Friday) with a follow up appointment. Please expect a phone call from the hospital in that time frame. If you do not receive a call or if you have any questions or concerns, you can reach them at [phone] or [phone] or [phone]          Viral Respiratory Infection    A viral respiratory infection is an illness that affects parts of the body used for breathing, like the lungs, nose, and throat. It is caused by a germ called a virus. Symptoms can include runny nose, coughing, sneezing, fatigue, body aches, sore throat, fever, or headache. Over the counter medicine can be used to manage the symptoms but the infection typically goes away on its own in 5 to 10 days.     SEEK IMMEDIATE MEDICAL CARE IF YOU HAVE ANY OF THE FOLLOWING SYMPTOMS: shortness of breath, chest pain, fever over 10 days, or lightheadedness/dizziness.

## 2024-03-19 NOTE — ED ADULT TRIAGE NOTE - HEIGHT IN INCHES
HPI Comments: 1year old male no medical hx presenting to the ED for head injury. Hx obtained from [facility] at bedside. Per , just PTA the pt was running away from her after she asked him to put on his shoes when he slipped an fell, falling back and hitting his head on a bench. No LOC, states that the pt got up and asked for ice cream.  No vomiting or mental status change. No tx PTA. No other concerns. PMHx: denies  Social: Twila Aguilar. Lives with family. Patient is a 1 y.o. male presenting with fall. The history is provided by the patient, the father and a caregiver. Pediatric Social History:    Fall    Pertinent negatives include no chest pain and no vomiting. History reviewed. No pertinent past medical history. History reviewed. No pertinent surgical history. History reviewed. No pertinent family history. Social History     Social History    Marital status: SINGLE     Spouse name: N/A    Number of children: N/A    Years of education: N/A     Occupational History    Not on file. Social History Main Topics    Smoking status: Never Smoker    Smokeless tobacco: Never Used    Alcohol use Not on file    Drug use: Not on file    Sexual activity: Not on file     Other Topics Concern    Not on file     Social History Narrative         ALLERGIES: Review of patient's allergies indicates no known allergies. Review of Systems   Constitutional: Negative for activity change, appetite change and fever. HENT: Negative for congestion and rhinorrhea. Eyes: Negative for discharge. Respiratory: Negative for stridor. Cardiovascular: Negative for chest pain and leg swelling. Gastrointestinal: Negative for diarrhea and vomiting. Genitourinary: Negative for decreased urine volume. Musculoskeletal: Negative for neck stiffness. Skin: Positive for wound. Negative for rash. Neurological: Negative for syncope.    All other systems reviewed and are negative. Vitals:    06/13/18 1257 06/13/18 1303   BP: 113/67 113/67   Pulse: 111 105   Resp: 24 26   Temp: 97.3 °F (36.3 °C) 97.3 °F (36.3 °C)   SpO2: 98% 98%   Weight: 18.8 kg             Physical Exam   Constitutional: He appears well-developed and well-nourished. He is active. No distress. Smiling, talkative, well-appearing child   HENT:   Head: No signs of injury. Right Ear: Tympanic membrane normal.   Left Ear: Tympanic membrane normal.   Nose: No nasal discharge. Mouth/Throat: Mucous membranes are moist. No tonsillar exudate. Oropharynx is clear. 1cm lac to posterior scalp  No signs of basilar skull fracture   Eyes: Pupils are equal, round, and reactive to light. Right eye exhibits no discharge. Left eye exhibits no discharge. Neck: Normal range of motion. Neck supple. Cardiovascular: Normal rate and regular rhythm. No murmur heard. Pulmonary/Chest: Effort normal and breath sounds normal. No nasal flaring. No respiratory distress. He has no wheezes. He exhibits no retraction. Abdominal: Soft. He exhibits no distension. There is no tenderness. There is no guarding. Musculoskeletal: Normal range of motion. He exhibits no deformity. Neurological: He is alert. Skin: Skin is warm and dry. Capillary refill takes less than 3 seconds. No cyanosis. No pallor. Nursing note and vitals reviewed. MDM  Number of Diagnoses or Management Options  Closed head injury, initial encounter:   Laceration of scalp, initial encounter:   Diagnosis management comments: 1year old male presenting for [facility] with scalp laceration. No mechanism, hx, exam findings c/f ICH or necessitating imaging. Wound cleaned, repaired with single staple. Care instructions, return precautions given.        Amount and/or Complexity of Data Reviewed  Discuss the patient with other providers: yes (Dr. Kay Heath, ED attending)          ED Course       Wound Repair  Date/Time: 6/13/2018 2:23 PM  Performed by: Jordana Elizalde provider: Dr. Kleber Mcgrath, ED attending  Preparation: skin prepped with Shur-Clens  Location details: scalp  Wound length:2.5 cm or less (1cm)    Anesthesia:  Local Anesthetic: topical anesthetic  Anesthetic total: 2 mL  Foreign bodies: no foreign bodies  Irrigation solution: saline  Debridement: none  Skin closure: staples  Number of sutures: 1  Technique: simple  Approximation: close  Patient tolerance: Patient tolerated the procedure well with no immediate complications  My total time at bedside, performing this procedure was 1-15 minutes. GCS: 15   No altered mental status;   No signs of basilar skull fracture  No LOC No vomiting  Non-severe mechanism of injury     No severe headache     PECARN tool does not recommend CT head: Less than 0.05% risk of clinically important traumatic brain injury: Discharge 6

## 2024-03-19 NOTE — ED PROVIDER NOTE - ATTENDING CONTRIBUTION TO CARE
33-year-old female history of DVT while pregnant, now presents with 3-day history of cough fever chest congestion and chest tightness with cough.  Was supposed to see her doctor today but who would not see her because she had a fever.  Nonproductive cough.    vss, actively coughing, nontoxic, no respiratory distress, ambulatory without difficulty, pink conj, anicteric, MMM, neck supple, CTAB, RRR, equal radial pulses bilat, abd soft/nt/nd, no cva tend. no calves tend, no edema, no fnd. no rashes.

## 2024-03-19 NOTE — ED ADULT NURSE NOTE - NSFALLUNIVINTERV_ED_ALL_ED
GI Discharge Instructions Endoscopy      3/9/2021    During your exam, the physician:    Took a biopsy of  Stomach and Small Intestine and Removed polyps    DIET INSTRUCTIONS:  Resume your regular diet    PRESCRIPTIONS/MEDICATIONS  No new prescriptions given today    A RESPONSIBLE ADULT MUST ACCOMPANY YOU AND DRIVE YOU HOME    You had the following procedure(s) today:   Colonoscopy and Upper Endoscopy     1. If a biopsy and/or a polyp removal was performed today.  Avoid Aspirin and NSAIDS  2. Following sedation, your judgement, perception and coordination are impaired.      Therefore, TODAY:  · Do not drive.  Do not return to work today.  · Do not operate appliances or machinery that require quick reaction time for 24 hours.  · Do not sign legal documents or be involved in work decisions for 24 hours.  · Do not smoke or drink alcoholic beverages for 24 hours.  · Plan to spend a few hours resting before resuming your normal routine    Please call your physician in the event that you experience any of the following or proceed to the nearest hospital in the event of an emergency:     UPPER ENDOSCOPY:    Difficulty swallowing or breathing  Neck swelling  Excessive pain, you may have mild chest pain or discomfort which should pass within 1-2 hours with the passage of air.  Nausea or Vomiting  Abdominal distention  Fever  A mild sore throat may follow this procedure.  Warm salt-water gargle or lozenges may relieve your discomfort.  ..  COLONOSCOPY  Fever  Severe abdominal distention or pain. Some mild distention and/or cramping are normal after these procedures but should pass within an hour or two with the passage of air.  Rectal bleeding more than blood streaking on the toilet  tissue  Nausea or Vomiting    If you have any questions or concerns, contact Dr. MELODY Snyder.    ADDITIONAL INSTRUCTIONS: none       Bed/Stretcher in lowest position, wheels locked, appropriate side rails in place/Call bell, personal items and telephone in reach/Instruct patient to call for assistance before getting out of bed/chair/stretcher/Non-slip footwear applied when patient is off stretcher/[place] to call system/Physically safe environment - no spills, clutter or unnecessary equipment/Purposeful proactive rounding/Room/bathroom lighting operational, light cord in reach

## 2024-03-19 NOTE — ED PROVIDER NOTE - PATIENT PORTAL LINK FT
You can access the FollowMyHealth Patient Portal offered by [facility] by registering at the following website: [email or web address]. By joining [employer]’s FollowMyHealth portal, you will also be able to view your health information using other applications (apps) compatible with our system.

## 2024-03-19 NOTE — ED PROVIDER NOTE - INTERPRETATION
ED EKG: my independent interpretation - Dr. Lee Carrillo : Sinus tachycardia at 105, normal axis, no ST elevations, normal intervals

## 2024-03-19 NOTE — ED PROVIDER NOTE - PHYSICAL EXAMINATION
CONSTITUTIONAL: NAD  SKIN: Warm, dry  HEAD: NCAT  EYES: Clear conjunctiva   ENT: MMM  NECK: Supple  CARD: RRR, S1, S2; no M/R/G  RESP: Normal respiratory effort, CTAB  ABD: Soft, nontender. No rebound, rigidity, or guarding  EXT: Pulses palpable distally  NEURO: Grossly intact. Awake, alert, moving all extremities, no facial asymmetry. CONSTITUTIONAL: NAD  SKIN: Warm, dry  HEAD: NCAT  EYES: Clear conjunctiva   ENMT: B/L TM pearly with good light reflex; Oral mucosa and posterior oropharynx moist, mildly erythematous, no tonsilar swelling, no exudates, ulcerations or lesions. Uvula midline. +nasal congestion  NECK: Supple  CARD: RRR, S1, S2; no M/R/G  RESP: Normal respiratory effort, CTAB  ABD: Soft, nontender. No rebound, rigidity, or guarding  EXT: Pulses palpable distally  NEURO: Grossly intact. Awake, alert, moving all extremities, no facial asymmetry.

## 2024-03-20 NOTE — CHART NOTE - NSCHARTNOTEFT_GEN_A_CORE
[facility] MRN [id number] Primary & Cardio / Pt already has established care w PCP. Pt declined services to f/u w cardiology 3/20 - JL    Specialty: PCP & CARDIOLOGY

## 2024-03-28 ENCOUNTER — APPOINTMENT (OUTPATIENT)
Dept: VASCULAR SURGERY | Facility: CLINIC | Age: 34
End: 2024-03-28

## 2024-06-17 ENCOUNTER — APPOINTMENT (OUTPATIENT)
Dept: PULMONOLOGY | Facility: CLINIC | Age: 34
End: 2024-06-17
Payer: MEDICAID

## 2024-06-17 VITALS
DIASTOLIC BLOOD PRESSURE: 64 MMHG | HEIGHT: 65 IN | HEART RATE: 84 BPM | WEIGHT: 135 LBS | SYSTOLIC BLOOD PRESSURE: 118 MMHG | OXYGEN SATURATION: 99 % | BODY MASS INDEX: 22.49 KG/M2

## 2024-06-17 DIAGNOSIS — Z86.718 PERSONAL HISTORY OF OTHER VENOUS THROMBOSIS AND EMBOLISM: ICD-10-CM

## 2024-06-17 DIAGNOSIS — Z86.2 PERSONAL HISTORY OF DISEASES OF THE BLOOD AND BLOOD-FORMING ORGANS AND CERTAIN DISORDERS INVOLVING THE IMMUNE MECHANISM: ICD-10-CM

## 2024-06-17 DIAGNOSIS — R91.1 SOLITARY PULMONARY NODULE: ICD-10-CM

## 2024-06-17 DIAGNOSIS — J45.991 COUGH VARIANT ASTHMA: ICD-10-CM

## 2024-06-17 DIAGNOSIS — R93.89 ABNORMAL FINDINGS ON DIAGNOSTIC IMAGING OF OTHER SPECIFIED BODY STRUCTURES: ICD-10-CM

## 2024-06-17 PROCEDURE — 99203 OFFICE O/P NEW LOW 30 MIN: CPT

## 2024-06-17 NOTE — PHYSICAL EXAM
[No Acute Distress] : no acute distress [Normal Oropharynx] : normal oropharynx [Normal Appearance] : normal appearance [No Neck Mass] : no neck mass [Normal Rate/Rhythm] : normal rate/rhythm [Normal S1, S2] : normal s1, s2 [No Murmurs] : no murmurs [No Resp Distress] : no resp distress [Clear to Auscultation Bilaterally] : clear to auscultation bilaterally [No Abnormalities] : no abnormalities [Normal Gait] : normal gait [No Clubbing] : no clubbing [No Cyanosis] : no cyanosis [No Edema] : no edema [FROM] : FROM [Oriented x3] : oriented x3 [Normal Affect] : normal affect

## 2024-06-17 NOTE — HISTORY OF PRESENT ILLNESS
[TextBox_4] : This is a 34-year-old female present for evaluation for abnormal CAT scan and for clearance for possible vascular procedure. Patient non-smoker she said in March she was complaining of cough fever diagnosed with the flu then COVID the cough was productive persist for almost 1 months seen by PMD had a CT scan which showed a new left lower lobe pneumonia and new nodular density in the lingula also chronic calcified bronchial impaction patient was given antibiotic by her PMD and as above the cough after 1 months duration resolved currently she denies any cough wheezing or shortness of breath. CT scan from February 2023 also reviewed Patient has history of DVT which developed during pregnancy and has history of factor V Leyden currently she is not on any anticoagulation

## 2024-06-17 NOTE — PLAN
[TextEntry] : ct scan ordered to conform resolution of left lingular nodular opacity  PFT ordered  told if any cough wheeze or sob to call me  will put preop eval next visit

## 2024-07-15 ENCOUNTER — APPOINTMENT (OUTPATIENT)
Dept: PULMONOLOGY | Facility: HOSPITAL | Age: 34
End: 2024-07-15
Payer: MEDICAID

## 2024-07-15 ENCOUNTER — OUTPATIENT (OUTPATIENT)
Dept: OUTPATIENT SERVICES | Facility: HOSPITAL | Age: 34
LOS: 1 days | End: 2024-07-15
Payer: MEDICAID

## 2024-07-15 DIAGNOSIS — R06.02 SHORTNESS OF BREATH: ICD-10-CM

## 2024-07-15 DIAGNOSIS — Z78.9 OTHER SPECIFIED HEALTH STATUS: Chronic | ICD-10-CM

## 2024-07-15 PROCEDURE — 94060 EVALUATION OF WHEEZING: CPT | Mod: 26

## 2024-07-15 PROCEDURE — 94729 DIFFUSING CAPACITY: CPT | Mod: 26

## 2024-07-15 PROCEDURE — 94664 DEMO&/EVAL PT USE INHALER: CPT

## 2024-07-15 PROCEDURE — 94727 GAS DIL/WSHOT DETER LNG VOL: CPT | Mod: 26

## 2024-07-15 PROCEDURE — 94727 GAS DIL/WSHOT DETER LNG VOL: CPT

## 2024-07-15 PROCEDURE — 94729 DIFFUSING CAPACITY: CPT

## 2024-07-15 PROCEDURE — 94070 EVALUATION OF WHEEZING: CPT

## 2024-07-16 DIAGNOSIS — R06.02 SHORTNESS OF BREATH: ICD-10-CM

## 2024-08-13 ENCOUNTER — APPOINTMENT (OUTPATIENT)
Dept: PULMONOLOGY | Facility: CLINIC | Age: 34
End: 2024-08-13

## 2024-11-20 NOTE — ASU DISCHARGE PLAN (ADULT/PEDIATRIC) - OK TO LEAVE MESSAGE ON VOICEMAIL
Contacted patient today but she and her  are in [place] and not in [place] so need to reschedule.  They both say memory is fine and will follow up in one year.         
Yes

## 2025-06-29 ENCOUNTER — EMERGENCY (EMERGENCY)
Facility: HOSPITAL | Age: 35
LOS: 0 days | Discharge: HOME | End: 2025-06-29
Attending: STUDENT IN AN ORGANIZED HEALTH CARE EDUCATION/TRAINING PROGRAM
Payer: MEDICAID

## 2025-06-29 VITALS
DIASTOLIC BLOOD PRESSURE: 79 MMHG | HEART RATE: 98 BPM | HEIGHT: 65 IN | WEIGHT: 145.06 LBS | OXYGEN SATURATION: 99 % | SYSTOLIC BLOOD PRESSURE: 112 MMHG | RESPIRATION RATE: 18 BRPM | TEMPERATURE: 98 F

## 2025-06-29 DIAGNOSIS — R06.02 SHORTNESS OF BREATH: ICD-10-CM

## 2025-06-29 DIAGNOSIS — Z78.9 OTHER SPECIFIED HEALTH STATUS: Chronic | ICD-10-CM

## 2025-06-29 DIAGNOSIS — Z86.718 PERSONAL HISTORY OF OTHER VENOUS THROMBOSIS AND EMBOLISM: ICD-10-CM

## 2025-06-29 DIAGNOSIS — R60.0 LOCALIZED EDEMA: ICD-10-CM

## 2025-06-29 DIAGNOSIS — M79.661 PAIN IN RIGHT LOWER LEG: ICD-10-CM

## 2025-06-29 DIAGNOSIS — D68.51 ACTIVATED PROTEIN C RESISTANCE: ICD-10-CM

## 2025-06-29 LAB
ALBUMIN SERPL ELPH-MCNC: 4.6 G/DL — SIGNIFICANT CHANGE UP (ref 3.5–5.2)
ALP SERPL-CCNC: 59 U/L — SIGNIFICANT CHANGE UP (ref 30–115)
ALT FLD-CCNC: 8 U/L — SIGNIFICANT CHANGE UP (ref 0–41)
ANION GAP SERPL CALC-SCNC: 13 MMOL/L — SIGNIFICANT CHANGE UP (ref 7–14)
APTT BLD: 32.5 SEC — SIGNIFICANT CHANGE UP (ref 27–39.2)
AST SERPL-CCNC: 15 U/L — SIGNIFICANT CHANGE UP (ref 0–41)
BASOPHILS # BLD AUTO: 0.04 K/UL — SIGNIFICANT CHANGE UP (ref 0–0.2)
BASOPHILS NFR BLD AUTO: 0.5 % — SIGNIFICANT CHANGE UP (ref 0–2)
BILIRUB SERPL-MCNC: 0.3 MG/DL — SIGNIFICANT CHANGE UP (ref 0.2–1.2)
BUN SERPL-MCNC: 8 MG/DL — LOW (ref 10–20)
CALCIUM SERPL-MCNC: 9.2 MG/DL — SIGNIFICANT CHANGE UP (ref 8.4–10.5)
CHLORIDE SERPL-SCNC: 102 MMOL/L — SIGNIFICANT CHANGE UP (ref 98–110)
CO2 SERPL-SCNC: 24 MMOL/L — SIGNIFICANT CHANGE UP (ref 17–32)
CREAT SERPL-MCNC: 0.6 MG/DL — LOW (ref 0.7–1.5)
EGFR: 119 ML/MIN/1.73M2 — SIGNIFICANT CHANGE UP
EGFR: 119 ML/MIN/1.73M2 — SIGNIFICANT CHANGE UP
EOSINOPHIL # BLD AUTO: 0.05 K/UL — SIGNIFICANT CHANGE UP (ref 0–0.5)
EOSINOPHIL NFR BLD AUTO: 0.6 % — SIGNIFICANT CHANGE UP (ref 0–6)
GLUCOSE SERPL-MCNC: 88 MG/DL — SIGNIFICANT CHANGE UP (ref 70–99)
HCG SERPL QL: NEGATIVE — SIGNIFICANT CHANGE UP
HCT VFR BLD CALC: 40.4 % — SIGNIFICANT CHANGE UP (ref 34.5–45)
HGB BLD-MCNC: 13 G/DL — SIGNIFICANT CHANGE UP (ref 11.5–15.5)
IMM GRANULOCYTES # BLD AUTO: 0.03 K/UL — SIGNIFICANT CHANGE UP (ref 0–0.07)
IMM GRANULOCYTES NFR BLD AUTO: 0.4 % — SIGNIFICANT CHANGE UP (ref 0–0.9)
INR BLD: 0.88 RATIO — SIGNIFICANT CHANGE UP (ref 0.65–1.3)
LYMPHOCYTES # BLD AUTO: 2.24 K/UL — SIGNIFICANT CHANGE UP (ref 1–3.3)
LYMPHOCYTES NFR BLD AUTO: 26.9 % — SIGNIFICANT CHANGE UP (ref 13–44)
MCHC RBC-ENTMCNC: 29.8 PG — SIGNIFICANT CHANGE UP (ref 27–34)
MCHC RBC-ENTMCNC: 32.2 G/DL — SIGNIFICANT CHANGE UP (ref 32–36)
MCV RBC AUTO: 92.7 FL — SIGNIFICANT CHANGE UP (ref 80–100)
MONOCYTES # BLD AUTO: 0.58 K/UL — SIGNIFICANT CHANGE UP (ref 0–0.9)
MONOCYTES NFR BLD AUTO: 7 % — SIGNIFICANT CHANGE UP (ref 2–14)
NEUTROPHILS # BLD AUTO: 5.39 K/UL — SIGNIFICANT CHANGE UP (ref 1.8–7.4)
NEUTROPHILS NFR BLD AUTO: 64.6 % — SIGNIFICANT CHANGE UP (ref 43–77)
NRBC # BLD AUTO: 0 K/UL — SIGNIFICANT CHANGE UP (ref 0–0)
NRBC # FLD: 0 K/UL — SIGNIFICANT CHANGE UP (ref 0–0)
NRBC BLD AUTO-RTO: 0 /100 WBCS — SIGNIFICANT CHANGE UP (ref 0–0)
PLATELET # BLD AUTO: 185 K/UL — SIGNIFICANT CHANGE UP (ref 150–400)
PMV BLD: 11.7 FL — SIGNIFICANT CHANGE UP (ref 7–13)
POTASSIUM SERPL-MCNC: 3.9 MMOL/L — SIGNIFICANT CHANGE UP (ref 3.5–5)
POTASSIUM SERPL-SCNC: 3.9 MMOL/L — SIGNIFICANT CHANGE UP (ref 3.5–5)
PROT SERPL-MCNC: 6.9 G/DL — SIGNIFICANT CHANGE UP (ref 6–8)
PROTHROM AB SERPL-ACNC: 10.4 SEC — SIGNIFICANT CHANGE UP (ref 9.95–12.87)
RBC # BLD: 4.36 M/UL — SIGNIFICANT CHANGE UP (ref 3.8–5.2)
RBC # FLD: 12.8 % — SIGNIFICANT CHANGE UP (ref 10.3–14.5)
SODIUM SERPL-SCNC: 139 MMOL/L — SIGNIFICANT CHANGE UP (ref 135–146)
WBC # BLD: 8.33 K/UL — SIGNIFICANT CHANGE UP (ref 3.8–10.5)
WBC # FLD AUTO: 8.33 K/UL — SIGNIFICANT CHANGE UP (ref 3.8–10.5)

## 2025-06-29 PROCEDURE — 85730 THROMBOPLASTIN TIME PARTIAL: CPT

## 2025-06-29 PROCEDURE — 36415 COLL VENOUS BLD VENIPUNCTURE: CPT

## 2025-06-29 PROCEDURE — 93970 EXTREMITY STUDY: CPT

## 2025-06-29 PROCEDURE — 93970 EXTREMITY STUDY: CPT | Mod: 26

## 2025-06-29 PROCEDURE — 71275 CT ANGIOGRAPHY CHEST: CPT | Mod: 26

## 2025-06-29 PROCEDURE — 85025 COMPLETE CBC W/AUTO DIFF WBC: CPT

## 2025-06-29 PROCEDURE — 80053 COMPREHEN METABOLIC PANEL: CPT

## 2025-06-29 PROCEDURE — 99285 EMERGENCY DEPT VISIT HI MDM: CPT

## 2025-06-29 PROCEDURE — 84703 CHORIONIC GONADOTROPIN ASSAY: CPT

## 2025-06-29 PROCEDURE — 99284 EMERGENCY DEPT VISIT MOD MDM: CPT | Mod: 25

## 2025-06-29 PROCEDURE — 71275 CT ANGIOGRAPHY CHEST: CPT

## 2025-06-29 PROCEDURE — 85610 PROTHROMBIN TIME: CPT

## 2025-06-29 NOTE — ED PROVIDER NOTE - DIFFERENTIAL DIAGNOSIS
The differential diagnosis for patients clinical presentation includes but is not limited to: dvt, sprain, strain Differential Diagnosis

## 2025-06-29 NOTE — ED PROVIDER NOTE - CLINICAL SUMMARY MEDICAL DECISION MAKING FREE TEXT BOX
36 yo F presented to ED for eval of R leg pain. Labs  were ordered and reviewed.  Imaging was ordered and reviewed by me. No PE on imaging. Chronic DVT on L on duplex. Appropriate medications for patient's presenting complaints were ordered and effects were reassessed.  Patient's records (prior hospital, ED visit, and/or nursing home notes if available) were reviewed.  Additional history was obtained from EMS, family, and/or PCP (where available).  Escalation to admission/observation was considered.  However patient feels much better and is comfortable with discharge.  Appropriate follow-up was arranged. Return precautions discussed in detail.

## 2025-06-29 NOTE — ED PROVIDER NOTE - CARE PROVIDER_API CALL
Parish Corbett.  Vascular Surgery  [address] [place] [address], [address]  [place] [address]  Phone: [phone]  Fax: [phone]  Follow Up Time: 4-6 Days

## 2025-06-29 NOTE — ED PROVIDER NOTE - PHYSICAL EXAMINATION
VITAL SIGNS: I have reviewed nursing notes and confirm.  CONSTITUTIONAL: Well-developed; well-nourished; in no acute distress.  EXT: Normal ROM. No cyanosis, edema, clubbing, or erythema noted on BLE. Mild TTP on right calf.   SKIN: Skin exam is warm and dry, no acute rash.

## 2025-06-29 NOTE — ED PROVIDER NOTE - CARE PLAN
Principal Discharge DX:	Pain, joint, lower leg, right   1 Principal Discharge DX:	Leg pain  Assessment and plan of treatment:	Plan- Duplex

## 2025-06-29 NOTE — ED PROVIDER NOTE - NSFOLLOWUPINSTRUCTIONS_ED_ALL_ED_FT
Our Emergency Department Referral Coordinators will be reaching out to you in the next 24-48 hours from 9:00am to 5:00pm to schedule a follow up appointment. Please expect a phone call from the hospital in that time frame. If you do not receive a call or if you have any questions or concerns, you can reach them at   [phone].    Leg Pain    WHAT YOU NEED TO KNOW:    Leg pain may be caused by a variety of health conditions. Your tests did not show any broken bones or blood clots.    DISCHARGE INSTRUCTIONS:    Return to the emergency department if:    You have a fever.    Your leg starts to swell.    Your leg pain gets worse.    You have numbness or tingling in your leg or toes.    You cannot put any weight on or move your leg.  Contact your healthcare provider if:    Your pain does not decrease, even after treatment.    You have questions or concerns about your condition or care.  Medicines:    NSAIDs, such as ibuprofen, help decrease swelling, pain, and fever. This medicine is available with or without a doctor's order. NSAIDs can cause stomach bleeding or kidney problems in certain people. If you take blood thinner medicine, always ask your healthcare provider if NSAIDs are safe for you. Always read the medicine label and follow directions.    Take your medicine as directed. Contact your healthcare provider if you think your medicine is not helping or if you have side effects. Tell your provider if you are allergic to any medicine. Keep a list of the medicines, vitamins, and herbs you take. Include the amounts, and when and why you take them. Bring the list or the pill bottles to follow-up visits. Carry your medicine list with you in case of an emergency.  Follow up with your healthcare provider as directed: You may need more tests to find the cause of your leg pain. You may need to see an orthopedic specialist or a physical therapist. Write down your questions so you remember to ask them during your visits.    Manage your leg pain:    Rest your injured leg so that it can heal. You may need an immobilizer, brace, or splint to limit the movement of your leg. You may need to avoid putting any weight on your leg for at least 48 hours. Return to normal activities as directed.    Ice the injury for 20 minutes every 4 hours for up to 24 hours, or as directed. Use an ice pack, or put crushed ice in a plastic bag. Cover it with a towel to protect your skin. Ice helps prevent tissue damage and decreases swelling and pain.    Elevate your injured leg above the level of your heart as often as you can. This will help decrease swelling and pain. If possible, prop your leg on pillows or blankets to keep the area elevated comfortably.    Use assistive devices as directed. You may need to use a cane or crutches. Assistive devices help decrease pain and pressure on your leg when you walk. Ask your healthcare provider for more information about assistive devices and how to use them correctly.    Maintain a healthy weight. Extra body weight can cause pressure and pain in your hip, knee, and ankle joints. Ask your healthcare provider how much you should weigh. Ask him to help you create a weight loss plan if you are overweight.

## 2025-06-29 NOTE — ED PROVIDER NOTE - OBJECTIVE STATEMENT
Patient is a 35-year-old female with a PMHx of Factor V Leiden presents for right lower extremity pain. Patient reports pain began 3 days ago, described as throbbing with radiation up to mid-thigh. Pain is constant. Nothing improves or worsens pain. Denies chest pain.

## 2025-06-29 NOTE — ED PROVIDER NOTE - NSPTACCESSSVCSAPPTDETAILS_ED_ALL_ED_FT
Patient needs a follow up appt with Dr. Chelle NEAL. Hx of Chronic Left DVT. Patient needs a follow up appt with Dr. Chelle NEAL. Hx of Chronic Left DVT.  also needs pulmonology for granuloma

## 2025-06-29 NOTE — ED PROVIDER NOTE - PROGRESS NOTE DETAILS
Per vacular tech, patient has a chronic left common femoral DVT. No DVT in RLE. Pt aware of findings and has f/u with Dr. Corbett. Authored by Kristal Carreon, DO: No dvt in R LE however chronic DVT in L common fem. Will obtain PE study to r/o PE, pt agreeable with plan

## 2025-06-29 NOTE — ED PROVIDER NOTE - PATIENT PORTAL LINK FT
You can access the FollowMyHealth Patient Portal offered by [facility] by registering at the following website: [email or web address]. By joining [employer]’s FollowMyHealth portal, you will also be able to view your health information using other applications (apps) compatible with our system. You can access the FollowMyHealth Patient Portal offered by [facility] by registering at the following website: [email or web address]. By joining [employer]’s FollowMyHealth portal, you will also be able to view your health information using other applications (apps) compatible with our system.

## 2025-06-29 NOTE — ED PROVIDER NOTE - ATTENDING CONTRIBUTION TO CARE
yes
36 yo F pmhx factor 5 Leiden, postpartum dvt not currently on AC presents to ED for eval of  RLE pain for past 3 days described as throbbing. Pt reports having intermittent SOB, reports she had viral infection this week with congestion. No chest pain. No nausea, vomiting.     CONSTITUTIONAL: NAD.   SKIN: warm, dry  HEAD: Normocephalic; atraumatic.  ENT: MMM.   NECK: Supple.  CARD: RRR.   RESP: No wheezes, rales or rhonchi.  ABD: soft ntnd.   EXT: Normal ROM. +LLE edema (baseline for pt) distal pulses intact b/l   NEURO: AAOx3, CN 2-12 grossly intact. +5/5 strength and sensation wnl in all extremities.